# Patient Record
Sex: FEMALE
[De-identification: names, ages, dates, MRNs, and addresses within clinical notes are randomized per-mention and may not be internally consistent; named-entity substitution may affect disease eponyms.]

---

## 2017-02-15 ENCOUNTER — HOSPITAL ENCOUNTER (OUTPATIENT)
Dept: HOSPITAL 53 - M LAB REF | Age: 82
End: 2017-02-15
Attending: INTERNAL MEDICINE
Payer: MEDICARE

## 2017-02-15 DIAGNOSIS — D50.9: Primary | ICD-10-CM

## 2017-02-15 LAB
FERRITIN SERPL-MCNC: 15 NG/ML (ref 8–252)
IRON SATN MFR SERPL: 7.5 % (ref 13.2–37.4)
TIBC SERPL-MCNC: 427 UG/DL (ref 250–450)

## 2017-02-16 ENCOUNTER — HOSPITAL ENCOUNTER (OUTPATIENT)
Dept: HOSPITAL 53 - M LAB REF | Age: 82
End: 2017-02-16
Attending: INTERNAL MEDICINE
Payer: MEDICARE

## 2017-02-16 DIAGNOSIS — D64.9: Primary | ICD-10-CM

## 2017-02-16 LAB
IRON SATN MFR SERPL: 28.2 % (ref 13.2–37.4)
TIBC SERPL-MCNC: 451 UG/DL (ref 250–450)

## 2017-03-02 ENCOUNTER — HOSPITAL ENCOUNTER (OUTPATIENT)
Dept: HOSPITAL 53 - M INFU | Age: 82
Discharge: HOME | End: 2017-03-02
Attending: INTERNAL MEDICINE
Payer: MEDICARE

## 2017-03-02 VITALS — WEIGHT: 138.23 LBS | HEIGHT: 59 IN | BODY MASS INDEX: 27.87 KG/M2

## 2017-03-02 DIAGNOSIS — D50.9: Primary | ICD-10-CM

## 2017-03-02 DIAGNOSIS — Z79.4: ICD-10-CM

## 2017-03-02 DIAGNOSIS — Z79.899: ICD-10-CM

## 2017-03-02 DIAGNOSIS — Z88.2: ICD-10-CM

## 2017-03-02 DIAGNOSIS — Z79.82: ICD-10-CM

## 2017-03-02 DIAGNOSIS — Z88.1: ICD-10-CM

## 2017-03-02 PROCEDURE — 96366 THER/PROPH/DIAG IV INF ADDON: CPT

## 2017-03-02 PROCEDURE — 96365 THER/PROPH/DIAG IV INF INIT: CPT

## 2017-04-20 ENCOUNTER — HOSPITAL ENCOUNTER (OUTPATIENT)
Dept: HOSPITAL 53 - M RAD | Age: 82
End: 2017-04-20
Attending: SURGERY
Payer: MEDICARE

## 2017-04-20 DIAGNOSIS — I65.22: Primary | ICD-10-CM

## 2017-04-20 NOTE — REP
MRA CAROTIDS WITHOUT CONTRAST:

 

HISTORY:  Carotid occlusion.

 

Unenhanced 2D time-of-flight MR angiography was performed at the level of the

carotid bifurcations.

 

The distal right common carotid artery and origins of the right external and

internal carotid arteries are normal.  There are no atherosclerotic lesions.

There is severe stenosis of 90% of the distal left common carotid artery. There

is severe stenosis and 90% of the left internal carotid artery at its origin.

There is moderate stenosis of 60% of the left external carotid artery at its

origin.  The vertebral arteries are patent.  The right vertebral artery is

dominant.

 

IMPRESSION:

 

1.  There is severe stenosis of 90% of the distal left common carotid artery.

 

2.  There is severe stenosis of 90% of the left internal carotid artery at its

origin.

 

 

Signed by

Andrade Medeiros MD 04/20/2017 02:01 P

## 2017-07-11 ENCOUNTER — HOSPITAL ENCOUNTER (EMERGENCY)
Dept: HOSPITAL 53 - M ED | Age: 82
Discharge: HOME | End: 2017-07-11
Payer: MEDICARE

## 2017-07-11 VITALS — WEIGHT: 132.5 LBS | HEIGHT: 58 IN | BODY MASS INDEX: 27.81 KG/M2

## 2017-07-11 VITALS — DIASTOLIC BLOOD PRESSURE: 63 MMHG | SYSTOLIC BLOOD PRESSURE: 134 MMHG

## 2017-07-11 DIAGNOSIS — R10.9: Primary | ICD-10-CM

## 2017-07-11 DIAGNOSIS — E11.9: ICD-10-CM

## 2017-07-11 DIAGNOSIS — Z86.73: ICD-10-CM

## 2017-07-11 DIAGNOSIS — N18.9: ICD-10-CM

## 2017-07-11 LAB
ALBUMIN SERPL BCG-MCNC: 3.8 GM/DL (ref 3.2–5.2)
ALBUMIN/GLOB SERPL: 1.09 {RATIO} (ref 1–1.93)
ALP SERPL-CCNC: 84 U/L (ref 45–117)
ALT SERPL W P-5'-P-CCNC: 17 U/L (ref 12–78)
ANION GAP SERPL CALC-SCNC: 9 MEQ/L (ref 8–16)
AST SERPL-CCNC: 14 U/L (ref 15–37)
BASOPHILS # BLD AUTO: 0 K/MM3 (ref 0–0.2)
BASOPHILS NFR BLD AUTO: 0.3 % (ref 0–1)
BILIRUB CONJ SERPL-MCNC: < 0.1 MG/DL (ref 0–0.2)
BILIRUB SERPL-MCNC: 0.5 MG/DL (ref 0.2–1)
BUN SERPL-MCNC: 36 MG/DL (ref 7–18)
CALCIUM SERPL-MCNC: 9.1 MG/DL (ref 8.8–10.2)
CHLORIDE SERPL-SCNC: 104 MEQ/L (ref 98–107)
CO2 SERPL-SCNC: 25 MEQ/L (ref 21–32)
CREAT SERPL-MCNC: 1.91 MG/DL (ref 0.55–1.02)
EOSINOPHIL # BLD AUTO: 0.2 K/MM3 (ref 0–0.5)
EOSINOPHIL NFR BLD AUTO: 2.4 % (ref 0–3)
ERYTHROCYTE [DISTWIDTH] IN BLOOD BY AUTOMATED COUNT: 12.6 % (ref 11.5–14.5)
GFR SERPL CREATININE-BSD FRML MDRD: 26.8 ML/MIN/{1.73_M2} (ref 32–?)
GLUCOSE SERPL-MCNC: 170 MG/DL (ref 83–110)
LARGE UNSTAINED CELL #: 0.1 K/MM3 (ref 0–0.4)
LARGE UNSTAINED CELL %: 1.1 % (ref 0–4)
LYMPHOCYTES # BLD AUTO: 0.8 K/MM3 (ref 1.5–4.5)
LYMPHOCYTES NFR BLD AUTO: 7.4 % (ref 24–44)
MCH RBC QN AUTO: 30.9 PG (ref 27–33)
MCHC RBC AUTO-ENTMCNC: 32.5 G/DL (ref 32–36.5)
MCV RBC AUTO: 95 FL (ref 80–96)
MONOCYTES # BLD AUTO: 0.6 K/MM3 (ref 0–0.8)
MONOCYTES NFR BLD AUTO: 6.3 % (ref 0–5)
NEUTROPHILS # BLD AUTO: 7.3 K/MM3 (ref 1.8–7.7)
NEUTROPHILS NFR BLD AUTO: 82.6 % (ref 36–66)
PLATELET # BLD AUTO: 198 K/MM3 (ref 150–450)
POTASSIUM SERPL-SCNC: 4.3 MEQ/L (ref 3.5–5.1)
PROT SERPL-MCNC: 7.3 GM/DL (ref 6.4–8.2)
SODIUM SERPL-SCNC: 138 MEQ/L (ref 136–145)
WBC # BLD AUTO: 8.9 K/MM3 (ref 4–10)

## 2017-07-11 PROCEDURE — 80076 HEPATIC FUNCTION PANEL: CPT

## 2017-07-11 PROCEDURE — 96376 TX/PRO/DX INJ SAME DRUG ADON: CPT

## 2017-07-11 PROCEDURE — 96375 TX/PRO/DX INJ NEW DRUG ADDON: CPT

## 2017-07-11 PROCEDURE — 85025 COMPLETE CBC W/AUTO DIFF WBC: CPT

## 2017-07-11 PROCEDURE — 94760 N-INVAS EAR/PLS OXIMETRY 1: CPT

## 2017-07-11 PROCEDURE — 83690 ASSAY OF LIPASE: CPT

## 2017-07-11 PROCEDURE — 93041 RHYTHM ECG TRACING: CPT

## 2017-07-11 PROCEDURE — 74176 CT ABD & PELVIS W/O CONTRAST: CPT

## 2017-07-11 PROCEDURE — 96374 THER/PROPH/DIAG INJ IV PUSH: CPT

## 2017-07-11 PROCEDURE — 81001 URINALYSIS AUTO W/SCOPE: CPT

## 2017-07-11 PROCEDURE — 87086 URINE CULTURE/COLONY COUNT: CPT

## 2017-07-11 PROCEDURE — 99284 EMERGENCY DEPT VISIT MOD MDM: CPT

## 2017-07-11 PROCEDURE — 80048 BASIC METABOLIC PNL TOTAL CA: CPT

## 2017-07-11 NOTE — REP
CT ABDOMEN AND PELVIS WITHOUT IV CONTRAST:

 

CT abdomen and pelvis performed without oral or IV contrast.  Sagittal and

coronal reconstruction images are performed.

 

There are chronic fibrotic changes of the lung bases.  Calcified granuloma is

seen in the right middle lobe.  The liver, gallbladder, adrenals and pancreas are

grossly unremarkable. The spleen demonstrates a nodule probably representing a

hemangioma measuring 2 cm in diameter.  A couple of tiny calcifications are seen

at the margins of  the nodule.  There is no hydronephrosis of either kidney.

Proximal ureters are not dilated.  No renal stones are seen.  There are bilateral

hip prosthesis causing adjacent streak artifact obscuring inferior pelvic

structures including the bladder and distal ends of the ureters.  There are

atherosclerotic calcifications of the abdominal aorta without aneurysm.  No

adenopathy is seen.  No free air or free fluid is seen.  There is sigmoid and

left colonic diverticulosis.  No thickening is seen of the visualized bowel

loops.  There is a large hiatal hernia.

 

IMPRESSION:

 

Large hiatal hernia.

 

Probably splenic hemangioma.  No renal stones and no hydroureteronephrosis.

However, the distal ends of the ureters and the bladder are obscured by adjacent

metallic artifact from metallic hip prostheses.  Colonic diverticulosis.  No

other acute abnormality identified.

 

 

Signed by

Wei Roque MD 07/11/2017 04:26 P

## 2017-07-12 NOTE — ED PDOC
Post-Departure Follow-Up


dr richter faxed formal report of ct abd/p for fu paulag











Lundborg-Gray,Maja MD Jul 12, 2017 10:57

## 2017-08-11 ENCOUNTER — HOSPITAL ENCOUNTER (EMERGENCY)
Dept: HOSPITAL 53 - M ED | Age: 82
LOS: 1 days | Discharge: HOME | End: 2017-08-12
Payer: MEDICARE

## 2017-08-11 VITALS — BODY MASS INDEX: 27.77 KG/M2 | WEIGHT: 132.28 LBS | HEIGHT: 58 IN

## 2017-08-11 DIAGNOSIS — E11.9: ICD-10-CM

## 2017-08-11 DIAGNOSIS — R42: Primary | ICD-10-CM

## 2017-08-11 DIAGNOSIS — D64.9: ICD-10-CM

## 2017-08-11 DIAGNOSIS — N18.9: ICD-10-CM

## 2017-08-11 LAB
ANION GAP SERPL CALC-SCNC: 6 MEQ/L (ref 8–16)
BASOPHILS # BLD AUTO: 0 K/MM3 (ref 0–0.2)
BASOPHILS NFR BLD AUTO: 0.5 % (ref 0–1)
BUN SERPL-MCNC: 24 MG/DL (ref 7–18)
CALCIUM SERPL-MCNC: 9.5 MG/DL (ref 8.8–10.2)
CHLORIDE SERPL-SCNC: 108 MEQ/L (ref 98–107)
CO2 SERPL-SCNC: 26 MEQ/L (ref 21–32)
CREAT SERPL-MCNC: 1.48 MG/DL (ref 0.55–1.02)
EOSINOPHIL # BLD AUTO: 0.1 K/MM3 (ref 0–0.5)
EOSINOPHIL NFR BLD AUTO: 1 % (ref 0–3)
ERYTHROCYTE [DISTWIDTH] IN BLOOD BY AUTOMATED COUNT: 12.4 % (ref 11.5–14.5)
GFR SERPL CREATININE-BSD FRML MDRD: 35.9 ML/MIN/{1.73_M2} (ref 32–?)
GLUCOSE SERPL-MCNC: 98 MG/DL (ref 83–110)
LARGE UNSTAINED CELL #: 0.1 K/MM3 (ref 0–0.4)
LARGE UNSTAINED CELL %: 1.3 % (ref 0–4)
LYMPHOCYTES # BLD AUTO: 0.6 K/MM3 (ref 1.5–4.5)
LYMPHOCYTES NFR BLD AUTO: 10.1 % (ref 24–44)
MCH RBC QN AUTO: 30.8 PG (ref 27–33)
MCHC RBC AUTO-ENTMCNC: 33.6 G/DL (ref 32–36.5)
MCV RBC AUTO: 91.6 FL (ref 80–96)
MONOCYTES # BLD AUTO: 0.4 K/MM3 (ref 0–0.8)
MONOCYTES NFR BLD AUTO: 6.9 % (ref 0–5)
NEUTROPHILS # BLD AUTO: 4.7 K/MM3 (ref 1.8–7.7)
NEUTROPHILS NFR BLD AUTO: 80.1 % (ref 36–66)
PLATELET # BLD AUTO: 211 K/MM3 (ref 150–450)
POTASSIUM SERPL-SCNC: 4.3 MEQ/L (ref 3.5–5.1)
SODIUM SERPL-SCNC: 140 MEQ/L (ref 136–145)
T4 FREE SERPL-MCNC: 0.97 NG/DL (ref 0.76–1.46)
WBC # BLD AUTO: 5.8 K/MM3 (ref 4–10)

## 2017-08-11 PROCEDURE — 84484 ASSAY OF TROPONIN QUANT: CPT

## 2017-08-11 PROCEDURE — 84439 ASSAY OF FREE THYROXINE: CPT

## 2017-08-11 PROCEDURE — 82553 CREATINE MB FRACTION: CPT

## 2017-08-11 PROCEDURE — 93005 ELECTROCARDIOGRAM TRACING: CPT

## 2017-08-11 PROCEDURE — 36415 COLL VENOUS BLD VENIPUNCTURE: CPT

## 2017-08-11 PROCEDURE — 93041 RHYTHM ECG TRACING: CPT

## 2017-08-11 PROCEDURE — 82550 ASSAY OF CK (CPK): CPT

## 2017-08-11 PROCEDURE — 70450 CT HEAD/BRAIN W/O DYE: CPT

## 2017-08-11 PROCEDURE — 94760 N-INVAS EAR/PLS OXIMETRY 1: CPT

## 2017-08-11 PROCEDURE — 96374 THER/PROPH/DIAG INJ IV PUSH: CPT

## 2017-08-11 PROCEDURE — 71010: CPT

## 2017-08-11 PROCEDURE — 99285 EMERGENCY DEPT VISIT HI MDM: CPT

## 2017-08-11 PROCEDURE — 80048 BASIC METABOLIC PNL TOTAL CA: CPT

## 2017-08-11 PROCEDURE — 96375 TX/PRO/DX INJ NEW DRUG ADDON: CPT

## 2017-08-11 PROCEDURE — 84443 ASSAY THYROID STIM HORMONE: CPT

## 2017-08-11 PROCEDURE — 85025 COMPLETE CBC W/AUTO DIFF WBC: CPT

## 2017-08-11 NOTE — REPUSA
CLINICAL HISTORY: Vertigo.

COMMENTS: 

A single frontal view of the chest was obtained without prior studies for comparison.

The cardiac silhouette is within normal limits. There is no evidence of hilar enlargement.

There is no evidence of a pulmonary nodule, infiltrate or atelectasis. There is no pleural effusion.

No bony abnormalities are seen.

IMPRESSION: 

Unremarkable chest.

Thank you for your kind referral of this patient.

     Electronically signed by JS CLAY MD on 08/11/2017 10:33:06 PM ET

## 2017-08-11 NOTE — REPUSA
CLINICAL HISTORY: Vertigo.

TECHNIQUE: Multiple axial CT images were obtained through the brain without IV contrast material.

COMMENTS: 

There is normal configuration of sella turcica. There are no intra or extra-axial collections. There 
is no mass effect or midline shift. There is no evidence of hematoma formation. No hydrocephalus is p
resent. The ventricles are symmetrical. No abnormal calcifications are present. 

There is diffuse age-appropriate cerebellar and cerebral atrophy with proportionally dilated ventricl
es and cortical sulci. 

There are bilateral periventricular and subcortical white matter hypolucencies compatible with mild c
hronic microvascular disease.

Otherwise, no significant focal abnormalities are seen either in the posterior fossa or supratentoria
l compartment.

IMPRESSION:

1. Age-appropriate cerebellar and cerebral atrophy.

2. Mild chronic microvascular disease.

3. No evidence of acute intracranial pathology. 

Thank you for your kind referral of this patient.

 

     Electronically signed by JS CLAY MD on 08/11/2017 10:32:23 PM ET

## 2017-08-12 VITALS — SYSTOLIC BLOOD PRESSURE: 178 MMHG | DIASTOLIC BLOOD PRESSURE: 71 MMHG

## 2017-08-12 NOTE — ECGEPIP
Stationary ECG Study

                           [facility] - ED

                                       

                                       Test Date:    2017

Pat Name:     ELIZABETH BULLOCK        Department:   

Patient ID:   [id number]                 Room:         -

Gender:       F                        Technician:   ric

:          1935               Requested By: SARA HARRIS

Order Number: [id number]     Reading MD:   Dheeraj Pizano

                                 Measurements

Intervals                              Axis          

Rate:         56                       P:            33

MI:           151                      QRS:          -14

QRSD:         83                       T:            8

QT:           412                                    

QTc:          399                                    

                           Interpretive Statements

SINUS BRADYCARDIA

NSTTW ABNORMALITIES

NO PRIORS

Electronically Signed On 2017 7:16:46 EDT by Dheeraj Pizano

## 2017-09-18 ENCOUNTER — HOSPITAL ENCOUNTER (OUTPATIENT)
Dept: HOSPITAL 53 - M LABDRAW1 | Age: 82
End: 2017-09-18
Attending: ORTHOPAEDIC SURGERY
Payer: MEDICARE

## 2017-09-18 DIAGNOSIS — M54.5: Primary | ICD-10-CM

## 2017-09-18 LAB
BASOPHILS # BLD AUTO: 0 K/MM3 (ref 0–0.2)
BASOPHILS NFR BLD AUTO: 0.7 % (ref 0–1)
EOSINOPHIL # BLD AUTO: 0.2 K/MM3 (ref 0–0.5)
EOSINOPHIL NFR BLD AUTO: 4.1 % (ref 0–3)
ERYTHROCYTE [DISTWIDTH] IN BLOOD BY AUTOMATED COUNT: 12.6 % (ref 11.5–14.5)
ERYTHROCYTE [SEDIMENTATION RATE] IN BLOOD BY WESTERGREN METHOD: 30 MM/HR (ref 0–30)
LARGE UNSTAINED CELL #: 0.1 K/MM3 (ref 0–0.4)
LARGE UNSTAINED CELL %: 1.7 % (ref 0–4)
LYMPHOCYTES # BLD AUTO: 0.9 K/MM3 (ref 1.5–4.5)
LYMPHOCYTES NFR BLD AUTO: 13.9 % (ref 24–44)
MCH RBC QN AUTO: 30 PG (ref 27–33)
MCHC RBC AUTO-ENTMCNC: 32.5 G/DL (ref 32–36.5)
MCV RBC AUTO: 92.4 FL (ref 80–96)
MONOCYTES # BLD AUTO: 0.5 K/MM3 (ref 0–0.8)
MONOCYTES NFR BLD AUTO: 8 % (ref 0–5)
NEUTROPHILS # BLD AUTO: 4.3 K/MM3 (ref 1.8–7.7)
NEUTROPHILS NFR BLD AUTO: 71.7 % (ref 36–66)
PLATELET # BLD AUTO: 192 K/MM3 (ref 150–450)
WBC # BLD AUTO: 6 K/MM3 (ref 4–10)

## 2018-03-27 ENCOUNTER — HOSPITAL ENCOUNTER (OUTPATIENT)
Dept: HOSPITAL 53 - M WHC | Age: 83
End: 2018-03-27
Attending: NURSE PRACTITIONER
Payer: MEDICARE

## 2018-03-27 DIAGNOSIS — Z12.12: ICD-10-CM

## 2018-03-27 DIAGNOSIS — Z12.31: Primary | ICD-10-CM

## 2018-03-27 DIAGNOSIS — Z78.0: ICD-10-CM

## 2018-03-27 DIAGNOSIS — Z80.0: ICD-10-CM

## 2018-03-27 DIAGNOSIS — Z01.419: Primary | ICD-10-CM

## 2018-03-27 DIAGNOSIS — N95.2: ICD-10-CM

## 2018-03-27 DIAGNOSIS — Z12.31: ICD-10-CM

## 2018-03-27 PROCEDURE — 77067 SCR MAMMO BI INCL CAD: CPT

## 2018-04-12 ENCOUNTER — HOSPITAL ENCOUNTER (OUTPATIENT)
Dept: HOSPITAL 53 - M INFU | Age: 83
Discharge: HOME | End: 2018-04-12
Attending: INTERNAL MEDICINE
Payer: MEDICARE

## 2018-04-12 DIAGNOSIS — D64.9: Primary | ICD-10-CM

## 2018-04-12 DIAGNOSIS — Z88.8: ICD-10-CM

## 2018-04-12 DIAGNOSIS — E32.9: ICD-10-CM

## 2018-04-12 DIAGNOSIS — I50.9: ICD-10-CM

## 2018-04-12 DIAGNOSIS — Z79.4: ICD-10-CM

## 2018-04-12 DIAGNOSIS — E03.9: ICD-10-CM

## 2018-04-12 DIAGNOSIS — N18.4: ICD-10-CM

## 2018-04-12 DIAGNOSIS — Z79.899: ICD-10-CM

## 2018-04-12 DIAGNOSIS — I11.9: ICD-10-CM

## 2018-04-12 DIAGNOSIS — E11.9: ICD-10-CM

## 2018-04-12 LAB — IMMEDIATE SPIN CROSSMATCH: 1 1

## 2018-04-12 PROCEDURE — 36430 TRANSFUSION BLD/BLD COMPNT: CPT

## 2018-04-19 ENCOUNTER — HOSPITAL ENCOUNTER (OUTPATIENT)
Dept: HOSPITAL 53 - M INFU | Age: 83
End: 2018-04-19
Attending: INTERNAL MEDICINE
Payer: MEDICARE

## 2018-04-19 DIAGNOSIS — Z88.8: ICD-10-CM

## 2018-04-19 DIAGNOSIS — E03.9: ICD-10-CM

## 2018-04-19 DIAGNOSIS — M54.2: ICD-10-CM

## 2018-04-19 DIAGNOSIS — I12.9: ICD-10-CM

## 2018-04-19 DIAGNOSIS — Z79.899: ICD-10-CM

## 2018-04-19 DIAGNOSIS — Z79.4: ICD-10-CM

## 2018-04-19 DIAGNOSIS — N18.4: ICD-10-CM

## 2018-04-19 DIAGNOSIS — D50.9: Primary | ICD-10-CM

## 2018-04-19 DIAGNOSIS — F32.9: ICD-10-CM

## 2018-04-19 DIAGNOSIS — Z79.891: ICD-10-CM

## 2018-04-19 DIAGNOSIS — I50.9: ICD-10-CM

## 2018-04-19 RX ADMIN — SODIUM CHLORIDE 1 MLS/HR: 9 INJECTION, SOLUTION INTRAVENOUS at 14:35

## 2018-04-19 RX ADMIN — SODIUM CHLORIDE 1 MLS/HR: 9 INJECTION, SOLUTION INTRAVENOUS at 13:35

## 2018-05-16 ENCOUNTER — HOSPITAL ENCOUNTER (OUTPATIENT)
Dept: HOSPITAL 53 - M LAB REF | Age: 83
End: 2018-05-16
Attending: INTERNAL MEDICINE
Payer: MEDICARE

## 2018-05-16 DIAGNOSIS — D50.9: Primary | ICD-10-CM

## 2018-05-16 LAB
FERRITIN: 71 NG/ML (ref 8–252)
IRON (FE): 54 UG/DL (ref 50–170)
IRON SATN MFR SERPL: 15.8 % (ref 13.2–45)
TOTAL IRON BINDING CAPACITY: 341 UG/DL (ref 250–450)

## 2018-05-16 PROCEDURE — 83550 IRON BINDING TEST: CPT

## 2018-12-05 ENCOUNTER — HOSPITAL ENCOUNTER (OUTPATIENT)
Dept: HOSPITAL 53 - M INFU | Age: 83
Discharge: HOME | End: 2018-12-05
Attending: INTERNAL MEDICINE
Payer: MEDICARE

## 2018-12-05 DIAGNOSIS — D50.9: Primary | ICD-10-CM

## 2018-12-05 DIAGNOSIS — Z88.8: ICD-10-CM

## 2018-12-05 DIAGNOSIS — Z88.2: ICD-10-CM

## 2018-12-05 RX ADMIN — SODIUM CHLORIDE 1 MLS/HR: 9 INJECTION, SOLUTION INTRAVENOUS at 10:38

## 2018-12-05 RX ADMIN — SODIUM CHLORIDE 1 MLS/HR: 9 INJECTION, SOLUTION INTRAVENOUS at 09:30

## 2018-12-20 ENCOUNTER — HOSPITAL ENCOUNTER (OUTPATIENT)
Dept: HOSPITAL 53 - M LAB REF | Age: 83
End: 2018-12-20
Attending: INTERNAL MEDICINE
Payer: MEDICARE

## 2018-12-20 DIAGNOSIS — D64.9: Primary | ICD-10-CM

## 2018-12-20 LAB
FERRITIN SERPL-MCNC: 160 NG/ML (ref 8–252)
IRON SATN MFR SERPL: 19.7 % (ref 13.2–45)
IRON SERPL-MCNC: 68 UG/DL (ref 50–170)
TIBC SERPL-MCNC: 345 UG/DL (ref 250–450)

## 2019-02-11 ENCOUNTER — HOSPITAL ENCOUNTER (OUTPATIENT)
Dept: HOSPITAL 53 - M LABDRAW1 | Age: 84
End: 2019-02-11
Attending: PHYSICIAN ASSISTANT
Payer: MEDICARE

## 2019-02-11 DIAGNOSIS — E03.9: Primary | ICD-10-CM

## 2019-02-11 DIAGNOSIS — D50.9: Primary | ICD-10-CM

## 2019-02-11 DIAGNOSIS — N18.4: ICD-10-CM

## 2019-02-11 DIAGNOSIS — D63.1: ICD-10-CM

## 2019-02-11 DIAGNOSIS — E11.21: ICD-10-CM

## 2019-02-11 DIAGNOSIS — D50.9: ICD-10-CM

## 2019-02-11 LAB
ALBUMIN SERPL BCG-MCNC: 4.2 GM/DL (ref 3.2–5.2)
ALT SERPL W P-5'-P-CCNC: 18 U/L (ref 12–78)
BASOPHILS # BLD AUTO: 0 10^3/UL (ref 0–0.2)
BASOPHILS NFR BLD AUTO: 0.6 % (ref 0–1)
BILIRUB SERPL-MCNC: 0.6 MG/DL (ref 0.2–1)
BUN SERPL-MCNC: 18 MG/DL (ref 7–18)
CALCIUM SERPL-MCNC: 9.3 MG/DL (ref 8.8–10.2)
CHLORIDE SERPL-SCNC: 105 MEQ/L (ref 98–107)
CO2 SERPL-SCNC: 25 MEQ/L (ref 21–32)
CREAT SERPL-MCNC: 1.46 MG/DL (ref 0.55–1.3)
EOSINOPHIL # BLD AUTO: 0.1 10^3/UL (ref 0–0.5)
EOSINOPHIL NFR BLD AUTO: 1.7 % (ref 0–3)
EST. AVERAGE GLUCOSE BLD GHB EST-MCNC: 166 MG/DL (ref 60–110)
FERRITIN SERPL-MCNC: 47 NG/ML (ref 8–252)
GFR SERPL CREATININE-BSD FRML MDRD: 36.4 ML/MIN/{1.73_M2} (ref 32–?)
GLUCOSE SERPL-MCNC: 65 MG/DL (ref 70–100)
HCT VFR BLD AUTO: 34.9 % (ref 36–47)
HCT VFR BLD AUTO: 34.9 % (ref 36–47)
HGB BLD-MCNC: 10.7 G/DL (ref 12–15.5)
HGB BLD-MCNC: 10.7 G/DL (ref 12–15.5)
IRON SERPL-MCNC: 60 UG/DL (ref 50–170)
LYMPHOCYTES # BLD AUTO: 0.8 10^3/UL (ref 1.5–4.5)
LYMPHOCYTES NFR BLD AUTO: 10.6 % (ref 24–44)
MCH RBC QN AUTO: 28.8 PG (ref 27–33)
MCH RBC QN AUTO: 28.8 PG (ref 27–33)
MCHC RBC AUTO-ENTMCNC: 30.7 G/DL (ref 32–36.5)
MCHC RBC AUTO-ENTMCNC: 30.7 G/DL (ref 32–36.5)
MCV RBC AUTO: 94.1 FL (ref 80–96)
MCV RBC AUTO: 94.1 FL (ref 80–96)
MONOCYTES # BLD AUTO: 0.7 10^3/UL (ref 0–0.8)
MONOCYTES NFR BLD AUTO: 9.8 % (ref 0–5)
NEUTROPHILS # BLD AUTO: 5.6 10^3/UL (ref 1.8–7.7)
NEUTROPHILS NFR BLD AUTO: 77 % (ref 36–66)
PLATELET # BLD AUTO: 259 10^3/UL (ref 150–450)
PLATELET # BLD AUTO: 259 10^3/UL (ref 150–450)
POTASSIUM SERPL-SCNC: 4.2 MEQ/L (ref 3.5–5.1)
PROT SERPL-MCNC: 7.2 GM/DL (ref 6.4–8.2)
RBC # BLD AUTO: 3.71 10^6/UL (ref 4–5.4)
RBC # BLD AUTO: 3.71 10^6/UL (ref 4–5.4)
SODIUM SERPL-SCNC: 139 MEQ/L (ref 136–145)
T4 FREE SERPL-MCNC: 0.91 NG/DL (ref 0.76–1.46)
TSH SERPL DL<=0.005 MIU/L-ACNC: 4.78 UIU/ML (ref 0.36–3.74)
WBC # BLD AUTO: 7.2 10^3/UL (ref 4–10)
WBC # BLD AUTO: 7.2 10^3/UL (ref 4–10)

## 2019-04-23 ENCOUNTER — HOSPITAL ENCOUNTER (OUTPATIENT)
Dept: HOSPITAL 53 - M LABDRAW1 | Age: 84
End: 2019-04-23
Attending: PHYSICIAN ASSISTANT
Payer: MEDICARE

## 2019-04-23 DIAGNOSIS — E03.9: ICD-10-CM

## 2019-04-23 DIAGNOSIS — D63.1: ICD-10-CM

## 2019-04-23 DIAGNOSIS — N18.9: Primary | ICD-10-CM

## 2019-04-23 LAB
BASOPHILS # BLD AUTO: 0.1 10^3/UL (ref 0–0.2)
BASOPHILS NFR BLD AUTO: 0.8 % (ref 0–1)
EOSINOPHIL # BLD AUTO: 0.2 10^3/UL (ref 0–0.5)
EOSINOPHIL NFR BLD AUTO: 3.3 % (ref 0–3)
FERRITIN SERPL-MCNC: 24 NG/ML (ref 8–252)
HCT VFR BLD AUTO: 32.6 % (ref 36–47)
HGB BLD-MCNC: 10.4 G/DL (ref 12–15.5)
IRON SATN MFR SERPL: 14.6 % (ref 13.2–45)
IRON SERPL-MCNC: 52 UG/DL (ref 50–170)
LYMPHOCYTES # BLD AUTO: 1 10^3/UL (ref 1.5–4.5)
LYMPHOCYTES NFR BLD AUTO: 15.6 % (ref 24–44)
MCH RBC QN AUTO: 29.3 PG (ref 27–33)
MCHC RBC AUTO-ENTMCNC: 31.9 G/DL (ref 32–36.5)
MCV RBC AUTO: 91.8 FL (ref 80–96)
MONOCYTES # BLD AUTO: 0.8 10^3/UL (ref 0–0.8)
MONOCYTES NFR BLD AUTO: 11.6 % (ref 0–5)
NEUTROPHILS # BLD AUTO: 4.6 10^3/UL (ref 1.8–7.7)
NEUTROPHILS NFR BLD AUTO: 68.2 % (ref 36–66)
PLATELET # BLD AUTO: 234 10^3/UL (ref 150–450)
RBC # BLD AUTO: 3.55 10^6/UL (ref 4–5.4)
T4 FREE SERPL-MCNC: 0.99 NG/DL (ref 0.76–1.46)
TIBC SERPL-MCNC: 355 UG/DL (ref 250–450)
TSH SERPL DL<=0.005 MIU/L-ACNC: 2.41 UIU/ML (ref 0.36–3.74)
WBC # BLD AUTO: 6.7 10^3/UL (ref 4–10)

## 2019-06-07 ENCOUNTER — HOSPITAL ENCOUNTER (EMERGENCY)
Dept: HOSPITAL 53 - M ED | Age: 84
LOS: 1 days | Discharge: ANOTHER HOSPITAL | End: 2019-06-08
Payer: MEDICARE

## 2019-06-07 VITALS — DIASTOLIC BLOOD PRESSURE: 70 MMHG | SYSTOLIC BLOOD PRESSURE: 153 MMHG

## 2019-06-07 VITALS — BODY MASS INDEX: 26.5 KG/M2 | WEIGHT: 126.26 LBS | HEIGHT: 58 IN

## 2019-06-07 DIAGNOSIS — N28.9: ICD-10-CM

## 2019-06-07 DIAGNOSIS — R11.2: ICD-10-CM

## 2019-06-07 DIAGNOSIS — Z88.1: ICD-10-CM

## 2019-06-07 DIAGNOSIS — Z79.899: ICD-10-CM

## 2019-06-07 DIAGNOSIS — K21.9: ICD-10-CM

## 2019-06-07 DIAGNOSIS — I10: ICD-10-CM

## 2019-06-07 DIAGNOSIS — D64.9: ICD-10-CM

## 2019-06-07 DIAGNOSIS — Z79.4: ICD-10-CM

## 2019-06-07 DIAGNOSIS — R79.89: Primary | ICD-10-CM

## 2019-06-07 DIAGNOSIS — E11.9: ICD-10-CM

## 2019-06-07 DIAGNOSIS — Z88.2: ICD-10-CM

## 2019-06-07 LAB
ALBUMIN SERPL BCG-MCNC: 3.9 GM/DL (ref 3.2–5.2)
ALT SERPL W P-5'-P-CCNC: 20 U/L (ref 12–78)
BASOPHILS # BLD AUTO: 0 10^3/UL (ref 0–0.2)
BASOPHILS NFR BLD AUTO: 0.1 % (ref 0–1)
BILIRUB CONJ SERPL-MCNC: 0.2 MG/DL (ref 0–0.2)
BILIRUB SERPL-MCNC: 1.1 MG/DL (ref 0.2–1)
CK MB CFR.DF SERPL CALC: 3.68
CK MB SERPL-MCNC: 7.7 NG/ML (ref ?–3.6)
CK SERPL-CCNC: 209 U/L (ref 26–192)
EOSINOPHIL # BLD AUTO: 0 10^3/UL (ref 0–0.5)
EOSINOPHIL NFR BLD AUTO: 0 % (ref 0–3)
HCT VFR BLD AUTO: 34.9 % (ref 36–47)
HGB BLD-MCNC: 11.6 G/DL (ref 12–15.5)
LIPASE SERPL-CCNC: 184 U/L (ref 73–393)
LYMPHOCYTES # BLD AUTO: 0.5 10^3/UL (ref 1.5–4.5)
LYMPHOCYTES NFR BLD AUTO: 3.1 % (ref 24–44)
MCH RBC QN AUTO: 29.6 PG (ref 27–33)
MCHC RBC AUTO-ENTMCNC: 33.2 G/DL (ref 32–36.5)
MCV RBC AUTO: 89 FL (ref 80–96)
MONOCYTES # BLD AUTO: 1.5 10^3/UL (ref 0–0.8)
MONOCYTES NFR BLD AUTO: 9.3 % (ref 0–5)
NEUTROPHILS # BLD AUTO: 14.2 10^3/UL (ref 1.8–7.7)
NEUTROPHILS NFR BLD AUTO: 87.1 % (ref 36–66)
PLATELET # BLD AUTO: 318 10^3/UL (ref 150–450)
PROT SERPL-MCNC: 7.6 GM/DL (ref 6.4–8.2)
RBC # BLD AUTO: 3.92 10^6/UL (ref 4–5.4)
TROPONIN I SERPL-MCNC: 0.57 NG/ML (ref ?–0.1)
WBC # BLD AUTO: 16.3 10^3/UL (ref 4–10)

## 2019-06-07 PROCEDURE — 81001 URINALYSIS AUTO W/SCOPE: CPT

## 2019-06-07 PROCEDURE — 80048 BASIC METABOLIC PNL TOTAL CA: CPT

## 2019-06-07 PROCEDURE — 84484 ASSAY OF TROPONIN QUANT: CPT

## 2019-06-07 PROCEDURE — 83690 ASSAY OF LIPASE: CPT

## 2019-06-07 PROCEDURE — 93005 ELECTROCARDIOGRAM TRACING: CPT

## 2019-06-07 PROCEDURE — 99285 EMERGENCY DEPT VISIT HI MDM: CPT

## 2019-06-07 PROCEDURE — 80047 BASIC METABLC PNL IONIZED CA: CPT

## 2019-06-07 PROCEDURE — 36415 COLL VENOUS BLD VENIPUNCTURE: CPT

## 2019-06-07 PROCEDURE — 96375 TX/PRO/DX INJ NEW DRUG ADDON: CPT

## 2019-06-07 PROCEDURE — 96361 HYDRATE IV INFUSION ADD-ON: CPT

## 2019-06-07 PROCEDURE — 82550 ASSAY OF CK (CPK): CPT

## 2019-06-07 PROCEDURE — 93041 RHYTHM ECG TRACING: CPT

## 2019-06-07 PROCEDURE — 96376 TX/PRO/DX INJ SAME DRUG ADON: CPT

## 2019-06-07 PROCEDURE — 85025 COMPLETE CBC W/AUTO DIFF WBC: CPT

## 2019-06-07 PROCEDURE — 96374 THER/PROPH/DIAG INJ IV PUSH: CPT

## 2019-06-07 PROCEDURE — 82553 CREATINE MB FRACTION: CPT

## 2019-06-07 PROCEDURE — 74021 RADEX ABDOMEN 3+ VIEWS: CPT

## 2019-06-07 PROCEDURE — 80076 HEPATIC FUNCTION PANEL: CPT

## 2019-06-07 RX ADMIN — SODIUM CHLORIDE SCH MLS/HR: 9 INJECTION, SOLUTION INTRAVENOUS at 21:20

## 2019-06-08 VITALS — DIASTOLIC BLOOD PRESSURE: 65 MMHG | SYSTOLIC BLOOD PRESSURE: 152 MMHG

## 2019-06-08 LAB
BUN SERPL-MCNC: 29 MG/DL (ref 7–18)
CALCIUM SERPL-MCNC: 7.9 MG/DL (ref 8.8–10.2)
CHLORIDE SERPL-SCNC: 108 MEQ/L (ref 98–107)
CK MB CFR.DF SERPL CALC: 3.25
CK MB SERPL-MCNC: 8 NG/ML (ref ?–3.6)
CK SERPL-CCNC: 246 U/L (ref 26–192)
CO2 SERPL-SCNC: 22 MEQ/L (ref 21–32)
CREAT SERPL-MCNC: 1.57 MG/DL (ref 0.55–1.3)
GFR SERPL CREATININE-BSD FRML MDRD: 33.4 ML/MIN/{1.73_M2} (ref 32–?)
GLUCOSE SERPL-MCNC: 160 MG/DL (ref 70–100)
POTASSIUM SERPL-SCNC: 4.3 MEQ/L (ref 3.5–5.1)
SODIUM SERPL-SCNC: 141 MEQ/L (ref 136–145)
TROPONIN I SERPL-MCNC: 0.8 NG/ML (ref ?–0.1)

## 2019-06-08 RX ADMIN — SODIUM CHLORIDE SCH MLS/HR: 9 INJECTION, SOLUTION INTRAVENOUS at 02:18

## 2019-06-08 NOTE — REP
ABDOMEN, THREE VIEWS:

 

HISTORY:  Abdominal pain.

 

COMPARISON: 08/11/2017

 

A small amount of air is present in the intestine. Several air fluid levels are

present.  There are no dilated loops of intestine. There is  no pneumoperitoneum.

The lungs are clear.

 

IMPRESSION:

 

Nonspecific bowel gas pattern.

 

 

Electronically Signed by

Andrade Medeiros MD 06/08/2019 08:52 A

## 2019-06-08 NOTE — ECGEPIP
[facility] - ED

                                       

                                       Test Date:    2019

Pat Name:     ELIZABETH BULLOCK        Department:   

Patient ID:   [id number]                 Room:         -

Gender:       Female                   Technician:   lauri

:          1935               Requested By: DHARA DOUGLAS

Order Number: [id number]     Reading MD:   Maximino Rodgers

                                 Measurements

Intervals                              Axis          

Rate:         93                       P:            54

[place]:           137                      QRS:          

QRSD:         82                       T:            20

QT:           359                                    

QTc:          447                                    

                           Interpretive Statements

SINUS RHYTHM

Nonspecific ST-T wave abnormalities

Baseline artifact

Electronically Signed on 2019 9:14:50 EDT by Maximino Rodgers

## 2019-06-08 NOTE — ECGEPIP
[facility] - ED

                                       

                                       Test Date:    2019

Pat Name:     ELIZABETH BULLOCK        Department:   

Patient ID:   [id number]                 Room:         -

Gender:       Female                   Technician:   AK

:          1935               Requested By: DHARA DOUGLAS

Order Number: [id number]     Reading MD:   Maximino Rodgers

                                 Measurements

Intervals                              Axis          

Rate:         87                       P:            46

MA:           123                      QRS:          

QRSD:         75                       T:            5

QT:           370                                    

QTc:          446                                    

                           Interpretive Statements

SINUS RHYTHM

Nonspecific ST-T wave abnormalities

Electronically Signed on 2019 9:17:20 EDT by Maximino Rodgers

## 2019-06-24 ENCOUNTER — HOSPITAL ENCOUNTER (INPATIENT)
Dept: HOSPITAL 53 - M ED | Age: 84
LOS: 2 days | Discharge: NURSING, REHAB OR LONG-TERM CARE | DRG: 920 | End: 2019-06-26
Attending: GENERAL PRACTICE | Admitting: HOSPITALIST
Payer: MEDICARE

## 2019-06-24 VITALS — DIASTOLIC BLOOD PRESSURE: 60 MMHG | SYSTOLIC BLOOD PRESSURE: 156 MMHG

## 2019-06-24 VITALS — WEIGHT: 125 LBS | BODY MASS INDEX: 26.97 KG/M2 | HEIGHT: 57 IN

## 2019-06-24 VITALS — SYSTOLIC BLOOD PRESSURE: 156 MMHG | DIASTOLIC BLOOD PRESSURE: 62 MMHG

## 2019-06-24 DIAGNOSIS — T81.31XA: Primary | ICD-10-CM

## 2019-06-24 DIAGNOSIS — Z88.8: ICD-10-CM

## 2019-06-24 DIAGNOSIS — E11.9: ICD-10-CM
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
## 2019-06-24 DIAGNOSIS — Z88.2: ICD-10-CM

## 2019-06-24 DIAGNOSIS — Z79.4: ICD-10-CM

## 2019-06-24 DIAGNOSIS — K21.9: ICD-10-CM

## 2019-06-24 DIAGNOSIS — I50.9: ICD-10-CM

## 2019-06-24 DIAGNOSIS — Z79.899: ICD-10-CM

## 2019-06-24 DIAGNOSIS — E03.9: ICD-10-CM

## 2019-06-24 DIAGNOSIS — D62: ICD-10-CM

## 2019-06-24 DIAGNOSIS — Z85.038: ICD-10-CM

## 2019-06-24 DIAGNOSIS — N18.4: ICD-10-CM

## 2019-06-24 DIAGNOSIS — I13.0: ICD-10-CM

## 2019-06-24 LAB
ALBUMIN SERPL BCG-MCNC: 2.5 GM/DL (ref 3.2–5.2)
ALT SERPL W P-5'-P-CCNC: 18 U/L (ref 12–78)
BASOPHILS # BLD AUTO: 0.1 10^3/UL (ref 0–0.2)
BASOPHILS NFR BLD AUTO: 0.7 % (ref 0–1)
BILIRUB CONJ SERPL-MCNC: 0.1 MG/DL (ref 0–0.2)
BILIRUB SERPL-MCNC: 0.2 MG/DL (ref 0.2–1)
BUN SERPL-MCNC: 16 MG/DL (ref 7–18)
CALCIUM SERPL-MCNC: 8.9 MG/DL (ref 8.8–10.2)
CHLORIDE SERPL-SCNC: 101 MEQ/L (ref 98–107)
CK MB CFR.DF SERPL CALC: 4.17
CK MB SERPL-MCNC: < 1 NG/ML (ref ?–3.6)
CK SERPL-CCNC: 24 U/L (ref 26–192)
CO2 SERPL-SCNC: 29 MEQ/L (ref 21–32)
CREAT SERPL-MCNC: 1.3 MG/DL (ref 0.55–1.3)
EOSINOPHIL # BLD AUTO: 0.2 10^3/UL (ref 0–0.5)
EOSINOPHIL NFR BLD AUTO: 2.2 % (ref 0–3)
GFR SERPL CREATININE-BSD FRML MDRD: 41.5 ML/MIN/{1.73_M2} (ref 32–?)
GLUCOSE SERPL-MCNC: 149 MG/DL (ref 70–100)
HCT VFR BLD AUTO: 29.4 % (ref 36–47)
HGB BLD-MCNC: 9.2 G/DL (ref 12–15.5)
INR PPP: 1
LIPASE SERPL-CCNC: 687 U/L (ref 73–393)
LYMPHOCYTES # BLD AUTO: 0.9 10^3/UL (ref 1.5–4.5)
LYMPHOCYTES NFR BLD AUTO: 9.9 % (ref 24–44)
MCH RBC QN AUTO: 28.2 PG (ref 27–33)
MCHC RBC AUTO-ENTMCNC: 31.3 G/DL (ref 32–36.5)
MCV RBC AUTO: 90.2 FL (ref 80–96)
MONOCYTES # BLD AUTO: 1 10^3/UL (ref 0–0.8)
MONOCYTES NFR BLD AUTO: 10.9 % (ref 0–5)
NEUTROPHILS # BLD AUTO: 6.6 10^3/UL (ref 1.8–7.7)
NEUTROPHILS NFR BLD AUTO: 75.6 % (ref 36–66)
PLATELET # BLD AUTO: 507 10^3/UL (ref 150–450)
POTASSIUM SERPL-SCNC: 4.1 MEQ/L (ref 3.5–5.1)
PROT SERPL-MCNC: 6.2 GM/DL (ref 6.4–8.2)
PROTHROMBIN TIME: 12.9 SECONDS (ref 11.8–14)
RBC # BLD AUTO: 3.26 10^6/UL (ref 4–5.4)
SODIUM SERPL-SCNC: 138 MEQ/L (ref 136–145)
TROPONIN I SERPL-MCNC: < 0.02 NG/ML (ref ?–0.1)
WBC # BLD AUTO: 8.7 10^3/UL (ref 4–10)

## 2019-06-24 RX ADMIN — INSULIN DETEMIR SCH UNITS: 100 INJECTION, SOLUTION SUBCUTANEOUS at 21:00

## 2019-06-24 RX ADMIN — SODIUM CHLORIDE SCH MLS/HR: 9 INJECTION, SOLUTION INTRAVENOUS at 16:30

## 2019-06-24 RX ADMIN — INSULIN LISPRO SCH UNITS: 100 INJECTION, SOLUTION INTRAVENOUS; SUBCUTANEOUS at 21:00

## 2019-06-24 RX ADMIN — INSULIN LISPRO SCH UNITS: 100 INJECTION, SOLUTION INTRAVENOUS; SUBCUTANEOUS at 17:18

## 2019-06-24 RX ADMIN — SODIUM CHLORIDE SCH MLS/HR: 9 INJECTION, SOLUTION INTRAVENOUS at 12:34

## 2019-06-24 NOTE — REP
Clinical:  Abdominal pain.  Evaluate for abscess.

 

Technique:  Real time gray scale and color evaluation using linear high frequency

transducer.

 

Findings:

Directed ultrasound examination at the site of previous surgical incision

demonstrates mild subcutaneous edema with minimal amount of suspected insinuating

fluid but no discrete drainable collection/abscess.

 

Impression:

Subcutaneous edema and minimal amount of subcutaneous stranding fluid without

definable drainable collection/abscess.

 

 

Electronically Signed by

Coy Ureña MD 06/24/2019 01:09 P

## 2019-06-24 NOTE — REP
Clinical:  Acute cerebrovascular accident.

 

Comparison:  08/11/2017 .

 

Findings:

Age-related atrophy and microvascular ischemic changes are appreciated.  The

ventricles and sulci are symmetric.  Gray-white differentiation is maintained.

There is no evidence for acute intracranial hemorrhage, mass/mass effect,

pathology or infarction.  No extra-axial fluid collection.  Calvarium is intact.

Paranasal sinuses and mastoid air cells are clear.

 

Impression:

Age related atrophy and microvascular ischemic changes.

No acute intracranial hemorrhage, infarction, or mass/mass effect.

 

 

Electronically Signed by

Coy Ureña MD 06/24/2019 01:40 P

## 2019-06-24 NOTE — REP
Clinical:  Abdominal pain

 

Technique:  Supine and cross-table lateral views of the abdomen and pelvis.

 

Findings:

Bowel gas pattern is nonspecific.  Postsurgical changes are appreciated with

suture material in the right mid abdomen and surgical staples midline.  Evidence

for prior posterior lumbar fusion and bilateral hip replacement.  Skeletal

structures demonstrate osteopenia and degenerative changes.

 

Impression:

Nonspecific bowel gas pattern.

 

 

Electronically Signed by

Coy Ureña MD 06/24/2019 12:56 P

## 2019-06-24 NOTE — HPEPDOC
General


Date of Admission


19


Date of Service:  2019


Primary Care Physician:  Nicki Meadows


Attending Physician:  DELPHINE VARGAS DO


Chief Complaint


The patient is a 84-year-old female admitted with a reason for visit of 

weakness.


Source:  Patient, Family


Exam Limitations:  No limitations





History of Present Illness


83 yo female who had colon resection 2 weeks ago in [place] for obstructing 

colon cancer brought into ED by family because of increasing weakness.  Daughter

states patient has been staying in bed except to get up for bathroom and to eat 

meals because "too weak".  Patient states poor appetite.  She states no  nausea,

no vomiting, some difficulty swallowing large pills and  yesterday abdomen pain 

(relief with tylenol).  States today abdomen pain is nagging, dull, achy and 

worse with moving in bed.  Staples still in place and daughter noticed some 

drainage. Yesterday daytime had normal stool but Last night patient had watery 

diarrhea.  Last antibiotic use post operatively 2-3 weeks ago. She has had no 

reoccurance of diarrhea today.   Daughter states on 19, she had 

brief episode of "garbled" speech lasting few minutes and then was okay - 

daughter states similar to her " chronic recurrent TIA" syndrome.    In the ED, 

CT Head showed no CVA,  Abdomen xray and US showed no wound abscess.





Home Medications


Scheduled


Ferrous Gluconate (Ferrous Gluconate) 324 Mg Tablet, 324 MG PO DAILY, (Reported)


Furosemide (Lasix) 40 Mg Tab, 40 MG PO DAILY, (Reported)


Insulin Aspart (Novolog Flexpen) 100 Unit/Ml Inj, 1 DOSE SC AC, (Reported)


   PER SLIDING SCALE 


Insulin Glargine,Hum.rec.anlog (Basaglar Kwikpen U-100) 100 Unit/1 Ml Ins

uln.pen, 30 UNIT SC QHS, (Reported)


Levothyroxine Sodium (Levothyroxine Sodium) 150 Mcg Tablet, 150 MCG PO QAM, 

(Reported)


Nifedipine (Nifedipine ER) 30 Mg Tablet.er, 30 MG PO DAILY, (Reported)





Scheduled PRN


Acetaminophen (Acetaminophen) 325 Mg Tablet, 650 MG PO Q4H PRN for PAIN, 

(Reported)





Allergies


Coded Allergies:  


     Sulfa (Sulfonamide Antibiotics) (Verified  Allergy, Intermediate, HIVES, 

19)


     lisinopril (Verified  Adverse Reaction, Unknown, DIZZINESS, 19)





Past Medical History


Medical History


Diabetes on chronic insulin without hyperglycemia but with hypoglycemia


History of CHF - unknown type


CKD stage IV (followed by Dr Acuña nephrology in [place])


GERD


Hypothyroid


HTN


Colon Cancer stage II without metastasis


Surgical History


Colon resection 2019





Family History


Significant Family History:  No pertinent family hx


mother  from pancreatitis





Social History


* Smoker:  non-smoker


Alcohol:  Denies


lives with daughter - was completely independent prior to abdomen surgery





A-FIB/CHADSVASC


A-FIB History


Current/History of A-Fib/PAF?:  No





Review of Systems


Other systems


10 systems reviewed  and negative except as per HPI





Physical Examination


General Exam:  Positive: Alert, Cooperative, No Acute Distress


Eye Exam:  Positive: PERRLA, Conjunctiva & lids normal, EOMI


ENT Exam:  Positive: Atraumatic, Mucous membr. moist/pink, Pharynx Normal, 

Tongue Midline


Neck Exam:  Positive: Supple, +2 carotid pulse wo bruit


Chest Exam:  Positive: Clear to auscultation, Normal air movement; 


   Negative: Rales, Rhonchi, Wheezing


Heart Exam:  Positive: Rate Normal, Regular Rhythm (no murmur)


Abdomen Exam:  Positive: Normal bowel sounds, Soft (NT ND no palpable masses, 

mild tenderness reproducible over incision/staples), Other (staples to mid 

abdomen intact with 1.5 cm wound dehisence.  )


Extremity Exam:  Positive: Normal pulses; 


   Negative: Clubbing, Cyanosis, Edema


Skin Exam:  Positive: Nl turgor and temperature, Other skin issue (abdomen wound

dehisense without cellulitis)


Neuro Exam:  Positive: Normal Speech, Other (decreased central muscle/core 

muscle strength, assistance needed rolling over in bed/sitting up; proximal 

thigh and forearm muscles weak bilaterally)


Psych Exam:  Positive: Mental status NL, Mood NL, Oriented x 3


Other physical findings


staples and abdomen skin cleansed with hydrogen peroxide.  18 staples removed 

with good wound approximated EXCEPT for 1.5 cm wound dehinsence from below 

umbilicus  (staples not holding skin together).  Steri strips placed above and 

below wound dehiscence.  no abdomen erythema





ABDOMEN US:Impression:


Subcutaneous edema and minimal amount of subcutaneous stranding fluid without


definable drainable collection/abscess.





Vital Signs





Vital Signs








  Date Time  Temp Pulse Resp B/P (MAP) Pulse Ox O2 Delivery O2 Flow Rate FiO2


 


19 13:53  72   97   


 


6/24/19 13:41    166/74 (104)    


 


19 11:38 98.1  16   Room Air  











Laboratory Data


Labs 24H


Laboratory Tests 2


19 12:25: 


Immature Granulocyte % (Auto) 0.7, White Blood Count 8.7, Red Blood Count 3.26L,

Hemoglobin 9.2L, Hematocrit 29.4L, Mean Corpuscular Volume 90.2, Mean C

orpuscular Hemoglobin 28.2, Mean Corpuscular Hemoglobin Concent 31.3L, Red Cell 

Distribution Width 14.6H, Platelet Count 507H, Neutrophils (%) (Auto) 75.6H, 

Lymphocytes (%) (Auto) 9.9L, Monocytes (%) (Auto) 10.9H, Eosinophils (%) (Auto) 

2.2, Basophils (%) (Auto) 0.7, Neutrophils # (Auto) 6.6, Lymphocytes # (Auto) 

0.9L, Monocytes # (Auto) 1.0H, Eosinophils # (Auto) 0.2, Basophils # (Auto) 0.1,

Nucleated Red Blood Cells % (auto) 0.0, Prothrombin Time 12.9, Prothromb Time 

International Ratio 1.00, Anion Gap 8, Glomerular Filtration Rate 41.5, Calcium 

Level 8.9, Aspartate Amino Transf (AST/SGOT) 20, Alanine Aminotransferase 

(ALT/SGPT) 18, Alkaline Phosphatase 68, Total Bilirubin 0.2, Direct Bilirubin 

0.1, Total Creatine Kinase 24L, Creatine Kinase MB < 1.0, Creatine Kinase MB 

Relative Index 4.17H, Troponin I < 0.02, Total Protein 6.2L, Albumin 2.5L, 

Albumin/Globulin Ratio 0.68L, Lipase 687H


CBC/BMP


Laboratory Tests


19 12:25








Red Blood Count 3.26 L, Mean Corpuscular Volume 90.2, Mean Corpuscular 

Hemoglobin 28.2, Mean Corpuscular Hemoglobin Concent 31.3 L, Red Cell 

Distribution Width 14.6 H, Neutrophils (%) (Auto) 75.6 H, Lymphocytes (%) (Auto)

9.9 L, Monocytes (%) (Auto) 10.9 H, Eosinophils (%) (Auto) 2.2, Basophils (%) 

(Auto) 0.7, Neutrophils # (Auto) 6.6, Lymphocytes # (Auto) 0.9 L, Monocytes # 

(Auto) 1.0 H, Eosinophils # (Auto) 0.2, Basophils # (Auto) 0.1


Microbiology


wound culture obtained from abdomen





 Assessment/Plan


Debility with proximal muscle weakness - post op (present on admission)


   PT/OT ARU consult. consider ST consult





wound dehiscence - abdomen - cleansed with H2O2 , staples removed, culture 

obtained. no signs of cellulitis.  NOT started on antibiotics





Anemia - post op from GI blood loss - no  need for transfusion at this time.  

monitor





HTN - stable - daughter has been holding nifedipine because BP not elevated.    

Will  not restart med and monitor





History of CHF - currently euvolemic. no signs of CHF.  Hold lasix.  saline lock

IVF





History of CKD IV - followed by nephrology in [place].  monitor. hold 

nephrotoxic agents.  lasix on hold.  saline lock IVF  No current signs of MANAN





Diabetes - on chronic insulin therapy with hypoglcyemia - daughter has decreased

levemir at home from 30 to 15 units with improvement of hypoglcyemia and 

stabilization of blood sugar.  continue to monitor.  Check HgA1c in AM





Elevated lipase due to recent bowel surgery





Colon Cancer stage II - will need surgical follow up in [place] in 2-4 weeks





Plan / VTE


VTE Prophylaxis Ordered?:  Yes (renal dose enoxaprin)





Plan


Anticipated Discharge:  Sub Acute Rehab


Advanced Directives:  Do Not Resuscitate (DNR), Do Not Intubate (DNI)











DELPHINE VARGAS DO               2019 14:31

## 2019-06-25 VITALS — SYSTOLIC BLOOD PRESSURE: 152 MMHG | DIASTOLIC BLOOD PRESSURE: 67 MMHG

## 2019-06-25 VITALS — SYSTOLIC BLOOD PRESSURE: 130 MMHG | DIASTOLIC BLOOD PRESSURE: 72 MMHG

## 2019-06-25 VITALS — DIASTOLIC BLOOD PRESSURE: 70 MMHG | SYSTOLIC BLOOD PRESSURE: 152 MMHG

## 2019-06-25 LAB
BUN SERPL-MCNC: 11 MG/DL (ref 7–18)
CALCIUM SERPL-MCNC: 8.6 MG/DL (ref 8.8–10.2)
CHLORIDE SERPL-SCNC: 109 MEQ/L (ref 98–107)
CO2 SERPL-SCNC: 27 MEQ/L (ref 21–32)
CREAT SERPL-MCNC: 0.94 MG/DL (ref 0.55–1.3)
EST. AVERAGE GLUCOSE BLD GHB EST-MCNC: 200 MG/DL (ref 60–110)
GFR SERPL CREATININE-BSD FRML MDRD: > 60 ML/MIN/{1.73_M2} (ref 32–?)
GLUCOSE SERPL-MCNC: 79 MG/DL (ref 70–100)
HCT VFR BLD AUTO: 25.5 % (ref 36–47)
HGB BLD-MCNC: 8.1 G/DL (ref 12–15.5)
MCH RBC QN AUTO: 28.4 PG (ref 27–33)
MCHC RBC AUTO-ENTMCNC: 31.8 G/DL (ref 32–36.5)
MCV RBC AUTO: 89.5 FL (ref 80–96)
PLATELET # BLD AUTO: 445 10^3/UL (ref 150–450)
POTASSIUM SERPL-SCNC: 4 MEQ/L (ref 3.5–5.1)
RBC # BLD AUTO: 2.85 10^6/UL (ref 4–5.4)
SODIUM SERPL-SCNC: 141 MEQ/L (ref 136–145)
WBC # BLD AUTO: 7.3 10^3/UL (ref 4–10)

## 2019-06-25 RX ADMIN — INSULIN LISPRO SCH UNITS: 100 INJECTION, SOLUTION INTRAVENOUS; SUBCUTANEOUS at 12:10

## 2019-06-25 RX ADMIN — INSULIN LISPRO SCH UNITS: 100 INJECTION, SOLUTION INTRAVENOUS; SUBCUTANEOUS at 07:26

## 2019-06-25 RX ADMIN — ENOXAPARIN SODIUM SCH MG: 30 INJECTION SUBCUTANEOUS at 08:37

## 2019-06-25 RX ADMIN — INSULIN LISPRO SCH UNITS: 100 INJECTION, SOLUTION INTRAVENOUS; SUBCUTANEOUS at 17:25

## 2019-06-25 RX ADMIN — INSULIN LISPRO SCH UNITS: 100 INJECTION, SOLUTION INTRAVENOUS; SUBCUTANEOUS at 21:00

## 2019-06-25 RX ADMIN — INSULIN DETEMIR SCH UNITS: 100 INJECTION, SOLUTION SUBCUTANEOUS at 22:29

## 2019-06-25 NOTE — ECGEPIP
[facility] - ED

                                       

                                       Test Date:    2019

Pat Name:     ELIZABETH BULLOCK        Department:   

Patient ID:   [id number]                 Room:         -

Gender:       Female                   Technician:   lauri

:          1935               Requested By: DHARA DOUGLAS

Order Number: [id number]     Reading MD:   Marla Jackson

                                 Measurements

Intervals                              Axis          

Rate:         78                       P:            48

VT:           151                      QRS:          

QRSD:         79                       T:            22

QT:           357                                    

QTc:          409                                    

                           Interpretive Statements

SINUS RHYTHM

NSTTW abnormalities

similar to prior EKG 19

Electronically Signed on 2019 15:25:27 EDT by Marla Jackson

## 2019-06-25 NOTE — IPNPDOC
Date Seen


The patient was seen on 6/25/19.





Progress Note


SUBJECTIVE:


no fever or chills overnight. per pt abdominal incision continues to have serous

drainage, soaking the abdominal pad. "No pain right now," pt says while supine 

in bed on her back with a pillow on her abdomen. no nausea, vomiting. still c/o 

generalized weakness and difficulty getting up and ambulating. 





OBJECTIVE:


Physical Examination


VITALS: Pls see below


General Exam:  Positive: Alert, Cooperative, No Acute Distress


Eye Exam:  Positive: PERRLA, Conjunctiva & lids normal, EOMI


ENT Exam:  Positive: Atraumatic, Mucous membr. moist/pink, Pharynx Normal, 

Tongue Midline


Neck Exam:  Positive: Supple, +2 carotid pulse wo bruit


Chest Exam:  Positive: Clear to auscultation, Normal air movement; 


   Negative: Rales, Rhonchi, Wheezing


Heart Exam:  Positive: Rate Normal, Regular Rhythm (no murmur)


Abdomen Exam:  Positive: Normal bowel sounds, Soft (NT ND no palpable masses, 

mild tenderness reproducible over incision/staples), Other (staples to mid 

abdomen intact with 1.5 cm wound dehisence.  )


Extremity Exam:  Positive: Normal pulses; 


   Negative: Clubbing, Cyanosis, Edema


Skin Exam:  Positive: Nl turgor and temperature, Other skin issue (abdomen wound

dehisense without cellulitis)


Neuro Exam:  Positive: Normal Speech, Other (decreased central muscle/core 

muscle strength, assistance needed rolling over in bed/sitting up; proximal 

thigh and forearm muscles weak bilaterally)


Psych Exam:  Positive: Mental status NL, Mood NL, Oriented x 3


staples and abdomen skin cleansed with hydrogen peroxide.  18 staples removed 

with good wound approximated EXCEPT for 1.5 cm wound dehinsence from below 

umbilicus  (staples not holding skin together).  Steri strips placed above and 

below wound dehiscence.  no abdomen erythema





LABORATORY DATA, IMAGING STUDIES, MICROBIOLOGY: pls see below





ABDOMEN US:Impression:


Subcutaneous edema and minimal amount of subcutaneous stranding fluid without


definable drainable collection/abscess.





Assessment/Plan:


85 yo female who had colon resection 2 weeks ago in [place] for obstructing 

colon cancer brought into ED by family because of increasing weakness.  Daughter

states patient has been staying in bed except to get up for bathroom and to eat 

meals because "too weak".  Patient states poor appetite.  She states no  nausea,

no vomiting, some difficulty swallowing large pills and  yesterday abdomen pain 

(relief with tylenol).  States today abdomen pain is nagging, dull, achy and 

worse with moving in bed.  Staples still in place and daughter noticed some 

drainage. Yesterday daytime had normal stool but Last night patient had watery 

diarrhea.  Last antibiotic use post operatively 2-3 weeks ago. She has had no 

reoccurance of diarrhea today.   Daughter states on saturday 6/22/19, she had 

brief episode of "garbled" speech lasting few minutes and then was okay - 

daughter states similar to her " chronic recurrent TIA" syndrome.    In the ED, 

CT Head showed no CVA,  Abdomen xray and US showed no wound abscess.





1.5 cm abdominal wound dehiscence


-surgical consult Dr. Gosselin for management


-obtain [facility] operative notes and discharge summary


-U/S: no abscess


- cleansed with H2O2 


-staples removed, culture obtained. 


-no signs of cellulitis on no antibiotics





Post-op Debility


-PT/OT/ARU consulted


-activity per surgery





Anemia 


- post op from GI blood loss 


- no  need for transfusion at this time.  


- monitor with serial hgb 





HTN 


- stable -


 daughter has been holding nifedipine because BP not elevated.    


Will  not restart med and monitor





History of CHF


 - currently euvolemic.


 no signs of CHF.  


Hold lasix. 


 saline lock IVF





History of CKD IV - 


followed by nephrology in [place].  


monitor. 


hold nephrotoxic agents. 


 lasix on hold.  


saline lock IVF 


 No current signs of MANAN





Diabetes 


- on chronic insulin therapy with hypoglcyemia


 - daughter has decreased levemir at home from 30 to 15 units with improvement 

of hypoglcyemia and stabilization of blood sugar.  


continue to monitor.  


HgA1c





Elevated lipase due to recent bowel surgery





Colon Cancer stage II 


- will need surgical follow up in [place] in 2-4 weeks


-obtain pathology report 


-obtain records from [place]





Plan / VTE


VTE Prophylaxis Ordered?:  Yes (renal dose enoxaprin)





VS, I&O, 24H, Fishbone


Vital Signs/I&O





Vital Signs








  Date Time  Temp Pulse Resp B/P (MAP) Pulse Ox O2 Delivery O2 Flow Rate FiO2


 


6/25/19 06:00 97.5 66 18 152/70 (97) 93   


 


6/24/19 11:38      Room Air  














I&O- Last 24 Hours up to 6 AM 


 


 6/25/19





 06:00


 


Intake Total 1650 ml


 


Output Total 0 ml


 


Balance 1650 ml











Laboratory Data


24H LABS


Laboratory Tests 2


6/24/19 12:25: 


Immature Granulocyte % (Auto) 0.7, White Blood Count 8.7, Red Blood Count 3.26L,

Hemoglobin 9.2L, Hematocrit 29.4L, Mean Corpuscular Volume 90.2, Mean C

orpuscular Hemoglobin 28.2, Mean Corpuscular Hemoglobin Concent 31.3L, Red Cell 

Distribution Width 14.6H, Platelet Count 507H, Neutrophils (%) (Auto) 75.6H, 

Lymphocytes (%) (Auto) 9.9L, Monocytes (%) (Auto) 10.9H, Eosinophils (%) (Auto) 

2.2, Basophils (%) (Auto) 0.7, Neutrophils # (Auto) 6.6, Lymphocytes # (Auto) 

0.9L, Monocytes # (Auto) 1.0H, Eosinophils # (Auto) 0.2, Basophils # (Auto) 0.1,

Nucleated Red Blood Cells % (auto) 0.0, Prothrombin Time 12.9, Prothromb Time 

International Ratio 1.00, Anion Gap 8, Glomerular Filtration Rate 41.5, Calcium 

Level 8.9, Aspartate Amino Transf (AST/SGOT) 20, Alanine Aminotransferase 

(ALT/SGPT) 18, Alkaline Phosphatase 68, Total Bilirubin 0.2, Direct Bilirubin 

0.1, Total Creatine Kinase 24L, Creatine Kinase MB < 1.0, Creatine Kinase MB 

Relative Index 4.17H, Troponin I < 0.02, Total Protein 6.2L, Albumin 2.5L, 

Albumin/Globulin Ratio 0.68L, Lipase 687H


6/24/19 16:57: Bedside Glucose (Misc Panel) 155H


6/24/19 20:30: Bedside Glucose (Misc Panel) 110


6/25/19 05:40: 


Nucleated Red Blood Cells % (auto) 0.0, Anion Gap 5L, Glomerular Filtration Rate

> 60.0, Calcium Level 8.6L, Estimated Mean Plasma Glucose 200H, Hemoglobin A1c 

8.6, Blood Urea Nitrogen 11, Creatinine 0.94, Sodium Level 141, Potassium Level 

4.0, Chloride Level 109H, Carbon Dioxide Level 27


CBC/BMP


Laboratory Tests


6/24/19 12:25








Red Blood Count 3.26 L, Mean Corpuscular Volume 90.2, Mean Corpuscular H

emoglobin 28.2, Mean Corpuscular Hemoglobin Concent 31.3 L, Red Cell 

Distribution Width 14.6 H, Neutrophils (%) (Auto) 75.6 H, Lymphocytes (%) (Auto)

9.9 L, Monocytes (%) (Auto) 10.9 H, Eosinophils (%) (Auto) 2.2, Basophils (%) 

(Auto) 0.7, Neutrophils # (Auto) 6.6, Lymphocytes # (Auto) 0.9 L, Monocytes # 

(Auto) 1.0 H, Eosinophils # (Auto) 0.2, Basophils # (Auto) 0.1





6/25/19 05:40








Red Blood Count 2.85 L, Mean Corpuscular Volume 89.5, Mean Corpuscular 

Hemoglobin 28.4, Mean Corpuscular Hemoglobin Concent 31.8 L, Red Cell 

Distribution Width 14.5, Calcium Level 8.6 L


Microbiology





Microbiology


6/24/19 Gram Stain - Final, Resulted


          


6/24/19 Wound Culture, Resulted


          Pending











BRITANY LUCIO MD            Jun 25, 2019 11:04

## 2019-06-25 NOTE — CR
DATE OF CONSULTATION:  06/25/2019

 

BRIEF HISTORY OF PRESENT ILLNESS:

The patient is an 84-year-old female who presents with drainage from her midline

incision 2 weeks after having a colectomy in [place].  She has been weak and

poor appetite, and presents to the hospital for failure to thrive at home.  I am

asked to evaluate her incision.

 

Her past medical history is significant for a history of diabetes mellitus,

history of congestive heart failure (CHF), history of chronic kidney disease,

history of gastroesophageal reflux disease, history of hypothyroidism,

hypertension and colon cancer.

 

Medications include iron, Lasix, insulin, Basaglar KwikPen, Synthroid and

nifedipine.

 

PHYSICAL EXAM:

Reveals a frail 84-year-old who looks stated age.  HEENT is unremarkable.  Neck:

Supple without adenopathy.  Lungs are clear anteriorly.  Heart is regular.

Abdomen is soft, nondistended, nontender.  Her incision is open in the lower

abdomen.  It has been attempted to be brought together with Steri-Strips;

however, some of these have come off.

 

IMPRESSION AND PLAN:

The patient has a superficial wound dehiscence.  At this point, my recommendation

is to start some dressing changes on her - I have ordered these, remove the rest

of the staples, and will see how this works for her.  She essentially has not

developed very much granulation tissue on this incision, and thus, it is

concerning that she may be someone who has some significant difficulties with

healing overall.  But once again, will see how her dressing changes go over the

next several days.

## 2019-06-26 VITALS — DIASTOLIC BLOOD PRESSURE: 71 MMHG | SYSTOLIC BLOOD PRESSURE: 136 MMHG

## 2019-06-26 RX ADMIN — INSULIN LISPRO SCH UNITS: 100 INJECTION, SOLUTION INTRAVENOUS; SUBCUTANEOUS at 07:30

## 2019-06-26 RX ADMIN — ENOXAPARIN SODIUM SCH MG: 30 INJECTION SUBCUTANEOUS at 09:00

## 2019-06-26 RX ADMIN — INSULIN LISPRO SCH UNITS: 100 INJECTION, SOLUTION INTRAVENOUS; SUBCUTANEOUS at 12:13

## 2019-06-26 RX ADMIN — ENOXAPARIN SODIUM SCH MG: 30 INJECTION SUBCUTANEOUS at 09:18

## 2019-06-26 NOTE — DS.PDOC
Discharge Summary


General


Date of Admission


Jun 24, 2019 at 14:27


Date of Discharge


June 26, 2019





Discharge Summary


Inpatient Consultant:


General Surgery: Dr. Leo Gosselin





Procedures 6/24/19:


staples removal


staples and abdomen skin cleansed with hydrogen peroxide.  18 staples removed 

with good wound approximated EXCEPT for 1.5 cm wound dehinsence from below 

umbilicus  (staples not holding skin together).  Steri strips placed above and 

below wound dehiscence.  no abdomen erythema





Discharged to: [facility]





Discharge Diagnoses:


Debility with proximal muscle weakness


1.5 cm abdominal surgical site wound dehiscence


postop anemia


HTN


colon ca stage 2 with resection 6/2019 


CKD 4


Hypothyroid





Discharge Medications: 


pls see below





Discharge instructions: wet to dry dressings per surgical recommendations. Outpt

fu with colorectal surgery in [place] as previously scheduled.





History of Presenting Illness:


83 yo female who had colon resection 2 weeks ago in [place] for obstructing 

colon cancer brought into ED by family because of increasing weakness.  Daughter

states patient has been staying in bed except to get up for bathroom and to eat 

meals because "too weak".  Patient states poor appetite.  She states no  nausea,

no vomiting, some difficulty swallowing large pills and  yesterday abdomen pain 

(relief with tylenol).  States today abdomen pain is nagging, dull, achy and 

worse with moving in bed.  Staples still in place and daughter noticed some 

drainage. Yesterday daytime had normal stool but Last night patient had watery 

diarrhea.  Last antibiotic use post operatively 2-3 weeks ago. She has had no 

reoccurance of diarrhea today.   Daughter states on saturday 6/22/19, she had 

brief episode of "garbled" speech lasting few minutes and then was okay - 

daughter states similar to her " chronic recurrent TIA" syndrome.    In the ED, 

CT Head showed no CVA,  Abdomen xray and US showed no wound abscess.





Hospital Course:


Debility with proximal muscle weakness - post op (present on admission)


-PT/OT ARU consult. 


-pt was transferred to [facility]





wound dehiscence 


- abdomen - cleansed with H2O2 , staples removed


-culture: proteus resistant to tigecycline. 


-no signs of cellulitis.  


-remained afebrile with no white count


-NOT started on antibiotics


-per General surgery: wet to dry dressings would be sufficient. no antibiotics


-outpt fu with her [place] colorectal surgeon as previously scheduled





Anemia 


- post op from GI blood loss 


- no  need for transfusion at this time. 


- Hgb 8


- denies shortness of breath, chest pain, pressure, tightness, dizziness, or 

lightheadedness





HTN 


- stable 


- daughter has been holding nifedipine because BP not elevated.    





History of CHF 


- currently euvolemic. 


-no signs of CHF.  


-Held lasix due to poor oral intake.  


- saline lock IVF





History of CKD IV 


- followed by nephrology in [place].  


- monitored with serial bmp


- hold nephrotoxic agents.  


- lasix on hold due to poor oral intake and risk of dehydration.  


- appears euvolemic


- saline lock IVF  


- No current signs of MANAN





Diabetes 


- on chronic insulin therapy with hypoglcyemia 


- daughter has decreased levemir at home from 30 to 15 units with improvement of

hypoglcyemia and stabilization of blood sugar.  


- fingersticks for monitoring.  


- pt has had poor appetite





Elevated lipase 


-due to recent bowel surgery





Colon Cancer stage II 


- will need surgical follow up in [place] in 2-4 weeks





DVT prophylaxis:


-son , pt's HCP, refused Lovenox , despite risk of DVT, PE in a pt with h/o 

colon ca 


-son wanted compression stockings





disposition: discharged to [facility]





Discharge Physical Examination:


Vitals: pls see below


General Exam:  Positive: Alert, Cooperative, No Acute Distress


Eye Exam:  Positive: PERRLA, Conjunctiva & lids normal, EOMI


ENT Exam:  Positive: Atraumatic, Mucous membr. moist/pink, Pharynx Normal, Ton

milind Midline


Neck Exam:  Positive: Supple, +2 carotid pulse wo bruit


Chest Exam:  Positive: Clear to auscultation, Normal air movement; 


   Negative: Rales, Rhonchi, Wheezing


Heart Exam:  Positive: Rate Normal, Regular Rhythm (no murmur)


Abdomen Exam:  Positive: Normal bowel sounds, Soft (NT ND no palpable masses, 

mild tenderness reproducible over incision. 1.5 cm wound dehiscence without


surrounding erythema or drainage. 


Extremity Exam:  Positive: Normal pulses; 


   Negative: Clubbing, Cyanosis, Edema


Skin Exam:  Positive: Nl turgor and temperature, Other skin issue (abdomen wound

dehisense without cellulitis)


Neuro Exam:  Positive: Normal Speech, Other (decreased central muscle/core 

muscle strength, assistance needed rolling over in bed/sitting up; proximal th

igh and forearm muscles weak bilaterally)


Psych Exam:  Positive: Mental status NL, Mood NL, Oriented x 3





Laboratory data, imaging studies, microbiology: pls see below





ABDOMEN US:Impression:


Subcutaneous edema and minimal amount of subcutaneous stranding fluid without


definable drainable collection/abscess.





Time spent on hospital discharge: 32 minutes.





Vital Signs/I&Os





Vital Signs








  Date Time  Temp Pulse Resp B/P (MAP) Pulse Ox O2 Delivery O2 Flow Rate FiO2


 


6/26/19 06:00 97.6 70 16 136/71 (92) 97   


 


6/24/19 11:38      Room Air  














I&O- Last 24 Hours up to 6 AM 


 


 6/26/19





 06:00


 


Intake Total 900 ml


 


Output Total 0 ml


 


Balance 900 ml











Laboratory Data


Labs 24H


Laboratory Tests 2


6/25/19 16:43: Bedside Glucose (Misc Panel) 215H


6/25/19 20:32: Bedside Glucose (Misc Panel) 174H


6/26/19 06:40: Bedside Glucose (Misc Panel) 44L


6/26/19 07:47: Bedside Glucose (Misc Panel) 121H


6/26/19 11:44: Bedside Glucose (Misc Panel) 201H


FSBS





Laboratory Tests








Test


 6/25/19


16:43 6/25/19


20:32 6/26/19


06:40 6/26/19


07:47 Range/Units


 


 


Bedside Glucose (Misc Panel) 215 174 44 121   MG/DL


 


Test


 6/26/19


11:44 


 


 


 Range/Units


 


 


Bedside Glucose (Misc Panel) 201      MG/DL











Microbiology





Microbiology


6/24/19 Gram Stain - Final, Resulted


          


6/24/19 Wound Culture - Preliminary, Resulted


          Proteus Mirabilis





Discharge Medications


Scheduled


Ferrous Gluconate (Ferrous Gluconate) 324 Mg Tablet, 324 MG PO DAILY, (Reported)


Furosemide (Lasix) 40 Mg Tab, 40 MG PO DAILY, (Reported)


Insulin Aspart (Novolog Flexpen) 100 Unit/Ml Inj, 1 DOSE SC AC, (Reported)


   PER SLIDING SCALE 


Insulin Glargine,Hum.rec.anlog (Basaglar Kwikpen U-100) 100 Unit/1 Ml 

Insuln.pen, 30 UNIT SC QHS, (Reported)


Levothyroxine Sodium (Levothyroxine Sodium) 150 Mcg Tablet, 150 MCG PO QAM, 

(Reported)


Nifedipine (Nifedipine ER) 30 Mg Tablet.er, 30 MG PO DAILY, (Reported)





Scheduled PRN


Acetaminophen (Acetaminophen) 325 Mg Tablet, 650 MG PO Q4H PRN for PAIN, 

(Reported)





Allergies


Coded Allergies:  


     Sulfa (Sulfonamide Antibiotics) (Verified  Allergy, Intermediate, HIVES, 

6/24/19)


     lisinopril (Verified  Adverse Reaction, Unknown, DIZZINESS, 6/24/19)











BRITANY LUCIO MD            Jun 26, 2019 16:51

## 2019-06-26 NOTE — IPNPDOC
Date Seen


The patient was seen on 6/26/19.





Progress Note


SUBJECTIVE:


no fever or chills overnight.  decreased appetite. "I wasn't really much of an 

eater." no c/o pain. wet to dry dressing done per surgery recommendations and no

binder needed when pt ambulates. still c/o weakness. no other issues per RN 

aside from pt's son requesting LOVENOX TO BE DISCONTINUED. Per son at the 

bedside, "YOU ARE NOT TO GIVE MY MOTHER ANYTHING UNLESS IT GOES THROUGH ME. THE 

SURGEONS IN [place] SENT HER HOME ON NO BLOOD THINNERS. THE MORE MEDICATIONS 

YOU GIVE, THE SICKER PEOPLE GET." Son refused to give permission to obtain 

medical records from [place] regarding his mother's diagnosis and medical 

history. "I WILL TALK TO MY SISTER, BUT YOU DO NOT HAVE MY PERMISSION TO TALK TO

ANY OF HER DOCTORS. I DON'T WANT YOU MEDDLING WITH ANYTHING. SHE IS HERE FOR 

REHAB, AND NOTHING ELSE."  Pt's son was adamant about no changes in medications,

further testing, evaluations. Son insists that "CANCER IS NOT TO BE SAID IN THIS

ROOM. YOU UNDERSTAND? HER SURGEON SAID HE TOOK IT ALL OUT. SHE DOES NOT HAVE 

CANCER." RN, Saima, was present while pt's son insisted on no Lovenox despite 

increased risk of DVT/PE in pt's with known malignancy. RN, Sherine Trujillo, was 

present when son refused to give permission to obtain medical records from 

[place] regarding his mother's diagnosis and medical history. "I WILL TALK TO 

MY SISTER, BUT YOU DO NOT HAVE MY PERMISSION TO TALK TO ANY OF HER DOCTORS. I 

DON'T WANT YOU MEDDLING WITH ANYTHING. SHE IS HERE FOR REHAB, AND NOTHING ELSE."

 Pt's son was adamant about no changes in medications, further testing, 

evaluations. Son insists that "CANCER IS NOT TO BE SAID IN THIS ROOM. YOU 

UNDERSTAND? HER SURGEON SAID HE TOOK IT ALL OUT. SHE DOES NOT HAVE CANCER." 





OBJECTIVE:


Physical Examination


VITALS: Pls see below


General Exam:  Positive: Alert, Cooperative, No Acute Distress


Eye Exam:  Positive: PERRLA, Conjunctiva & lids normal, EOMI


ENT Exam:  Positive: Atraumatic, Mucous membr. moist/pink, Pharynx Normal, 

Tongue Midline


Neck Exam:  Positive: Supple, +2 carotid pulse wo bruit


Chest Exam:  Positive: Clear to auscultation, Normal air movement; 


   Negative: Rales, Rhonchi, Wheezing


Heart Exam:  Positive: Rate Normal, Regular Rhythm (no murmur)


Abdomen Exam:  Positive: Normal bowel sounds, Soft (NT ND no palpable masses, 

mild tenderness reproducible over incision/staples), Other (staples to mid 

abdomen intact with 1.5 cm wound dehisence.  )


Extremity Exam:  Positive: Normal pulses; 


   Negative: Clubbing, Cyanosis, Edema


Skin Exam:  Positive: Nl turgor and temperature, Other skin issue (abdomen wound

dehisense without cellulitis)


Neuro Exam:  Positive: Normal Speech, Other (decreased central muscle/core 

muscle strength, assistance needed rolling over in bed/sitting up; proximal 

thigh and forearm muscles weak bilaterally)


Psych Exam:  Positive: Mental status NL, Mood NL, Oriented x 3


staples and abdomen skin cleansed with hydrogen peroxide.  18 staples removed 

with good wound approximated EXCEPT for 1.5 cm wound dehinsence from below 

umbilicus  (staples not holding skin together).  Steri strips placed above and 

below wound dehiscence.  no abdomen erythema





LABORATORY DATA, IMAGING STUDIES, MICROBIOLOGY: pls see below





ABDOMEN US:Impression:


Subcutaneous edema and minimal amount of subcutaneous stranding fluid without


definable drainable collection/abscess.





Assessment/Plan:


85 yo female who had colon resection 2 weeks ago in [place] for obstructing 

colon cancer brought into ED by family because of increasing weakness.  Daughter

states patient has been staying in bed except to get up for bathroom and to eat 

meals because "too weak".  Patient states poor appetite.  She states no  nausea,

no vomiting, some difficulty swallowing large pills and  yesterday abdomen pain 

(relief with tylenol).  States today abdomen pain is nagging, dull, achy and 

worse with moving in bed.  Staples still in place and daughter noticed some 

drainage. Yesterday daytime had normal stool but Last night patient had watery 

diarrhea.  Last antibiotic use post operatively 2-3 weeks ago. She has had no 

reoccurance of diarrhea today.   Daughter states on saturday 6/22/19, she had 

brief episode of "garbled" speech lasting few minutes and then was okay - 

daughter states similar to her " chronic recurrent TIA" syndrome.    In the ED, 

CT Head showed no CVA,  Abdomen xray and US showed no wound abscess.





1.5 cm abdominal wound dehiscence


-surgical consult Dr. Gosselin for management


-obtain [facility] operative notes and discharge summary


-U/S: no abscess


- cleansed with H2O2 


-staples removed, culture obtained. 


-no signs of cellulitis on no antibiotics





Post-op Debility


-PT/OT/ARU consulted


-activity per surgery





Anemia 


- post op from GI blood loss 


- no  need for transfusion at this time.  


- monitor with serial hgb 





HTN 


- stable -


 daughter has been holding nifedipine because BP not elevated.    


Will  not restart med and monitor





History of CHF


 - currently euvolemic.


 no signs of CHF.  


Hold lasix. 


 saline lock IVF





History of CKD IV - 


followed by nephrology in [place].  


monitor. 


hold nephrotoxic agents. 


 lasix on hold.  


saline lock IVF 


 No current signs of MANAN





Diabetes 


- on chronic insulin therapy with hypoglcyemia


 - daughter has decreased levemir at home from 30 to 15 units with improvement 

of hypoglcyemia and stabilization of blood sugar.  


continue to monitor.  


HgA1c





Elevated lipase due to recent bowel surgery





Colon Cancer stage II 


- will need surgical follow up in [place] in 2-4 weeks


-Per son at the bedside, "YOU ARE NOT TO GIVE MY MOTHER ANYTHING UNLESS IT GOES 

THROUGH ME. THE SURGEONS IN [place] SENT HER HOME ON NO BLOOD THINNERS. THE 

MORE MEDICATIONS YOU GIVE, THE SICKER PEOPLE GET." Son refused to give 

permission to obtain medical records from [place] regarding his mother's 

diagnosis and medical history. "I WILL TALK TO MY SISTER, BUT YOU DO NOT HAVE MY

PERMISSION TO TALK TO ANY OF HER DOCTORS. I DON'T WANT YOU MEDDLING WITH 

ANYTHING. SHE IS HERE FOR REHAB, AND NOTHING ELSE."  Pt's son was adamant about 

no changes in medications, further testing, evaluations. Son insists that 

"CANCER IS NOT TO BE SAID IN THIS ROOM. YOU UNDERSTAND? HER SURGEON SAID HE TOOK

IT ALL OUT. SHE DOES NOT HAVE CANCER." 


-RN, Saima Briceño , was present while pt's son insisted on no Lovenox despite 

increased risk of DVT/PE in pt's with known malignancy. "YOU ARE NOT TO GIVE MY 

MOTHER ANYTHING UNLESS IT GOES THROUGH ME. THE SURGEONS IN [place] SENT HER 

HOME ON NO BLOOD THINNERS. THE MORE MEDICATIONS YOU GIVE, THE SICKER PEOPLE 

GET." 


-RN, Sherine Godfrey, was present when son refused to give permission to obtain 

medical records from [place] regarding his mother's diagnosis and medical 

history. "I WILL TALK TO MY SISTER, BUT YOU DO NOT HAVE MY PERMISSION TO TALK TO

ANY OF HER DOCTORS. I DON'T WANT YOU MEDDLING WITH ANYTHING. SHE IS HERE FOR 

REHAB, AND NOTHING ELSE."  Pt's son was adamant about no changes in medications,

further testing, evaluations. Son insists that "CANCER IS NOT TO BE SAID IN THIS

ROOM. YOU UNDERSTAND? HER SURGEON SAID HE TOOK IT ALL OUT. SHE DOES NOT HAVE 

CANCER." 





Plan / VTE


VTE Prophylaxis Ordered?:  Yes (pt's HCP, son refused enoxaprin)





VS, I&O, 24H, Fishbone


Vital Signs/I&O





Vital Signs








  Date Time  Temp Pulse Resp B/P (MAP) Pulse Ox O2 Delivery O2 Flow Rate FiO2


 


6/26/19 06:00 97.6 70 16 136/71 (92) 97   


 


6/24/19 11:38      Room Air  














I&O- Last 24 Hours up to 6 AM 


 


 6/26/19





 06:00


 


Intake Total 900 ml


 


Output Total 0 ml


 


Balance 900 ml











Laboratory Data


24H LABS


Laboratory Tests 2


6/25/19 16:43: Bedside Glucose (Misc Panel) 215H


6/25/19 20:32: Bedside Glucose (Misc Panel) 174H


6/26/19 06:40: Bedside Glucose (Misc Panel) 44L


6/26/19 07:47: Bedside Glucose (Misc Panel) 121H


6/26/19 11:44: Bedside Glucose (Misc Panel) 201H


Microbiology





Microbiology


6/24/19 Gram Stain - Final, Resulted


          


6/24/19 Wound Culture - Preliminary, Resulted


          Proteus Mirabilis











BRITANY LUCIO MD            Jun 26, 2019 13:09

## 2019-07-02 ENCOUNTER — HOSPITAL ENCOUNTER (OUTPATIENT)
Dept: HOSPITAL 53 - SSVSNF3 | Age: 84
End: 2019-07-02
Attending: INTERNAL MEDICINE

## 2019-07-02 DIAGNOSIS — D64.9: ICD-10-CM

## 2019-07-02 DIAGNOSIS — I10: ICD-10-CM

## 2019-07-02 DIAGNOSIS — C18.9: Primary | ICD-10-CM

## 2019-07-02 DIAGNOSIS — N18.4: ICD-10-CM

## 2019-07-02 LAB
BUN SERPL-MCNC: 12 MG/DL (ref 7–18)
CALCIUM SERPL-MCNC: 9.2 MG/DL (ref 8.8–10.2)
CHLORIDE SERPL-SCNC: 104 MEQ/L (ref 98–107)
CO2 SERPL-SCNC: 24 MEQ/L (ref 21–32)
CREAT SERPL-MCNC: 1.12 MG/DL (ref 0.55–1.3)
GFR SERPL CREATININE-BSD FRML MDRD: 49.3 ML/MIN/{1.73_M2} (ref 32–?)
GLUCOSE SERPL-MCNC: 114 MG/DL (ref 70–100)
HCT VFR BLD AUTO: 29.3 % (ref 36–47)
HGB BLD-MCNC: 9.1 G/DL (ref 12–15.5)
MCH RBC QN AUTO: 28 PG (ref 27–33)
MCHC RBC AUTO-ENTMCNC: 31.1 G/DL (ref 32–36.5)
MCV RBC AUTO: 90.2 FL (ref 80–96)
PLATELET # BLD AUTO: 411 10^3/UL (ref 150–450)
POTASSIUM SERPL-SCNC: 4.9 MEQ/L (ref 3.5–5.1)
RBC # BLD AUTO: 3.25 10^6/UL (ref 4–5.4)
SODIUM SERPL-SCNC: 139 MEQ/L (ref 136–145)
WBC # BLD AUTO: 7.3 10^3/UL (ref 4–10)

## 2019-07-09 ENCOUNTER — HOSPITAL ENCOUNTER (OUTPATIENT)
Dept: HOSPITAL 53 - SSVSNF3 | Age: 84
End: 2019-07-09
Attending: INTERNAL MEDICINE
Payer: MEDICARE

## 2019-07-09 DIAGNOSIS — C18.9: Primary | ICD-10-CM

## 2019-07-09 DIAGNOSIS — D64.9: ICD-10-CM

## 2019-07-09 LAB
BUN SERPL-MCNC: 16 MG/DL (ref 7–18)
CALCIUM SERPL-MCNC: 9.3 MG/DL (ref 8.8–10.2)
CHLORIDE SERPL-SCNC: 107 MEQ/L (ref 98–107)
CO2 SERPL-SCNC: 26 MEQ/L (ref 21–32)
CREAT SERPL-MCNC: 1.31 MG/DL (ref 0.55–1.3)
GFR SERPL CREATININE-BSD FRML MDRD: 41.2 ML/MIN/{1.73_M2} (ref 32–?)
GLUCOSE SERPL-MCNC: 136 MG/DL (ref 70–100)
HCT VFR BLD AUTO: 30.4 % (ref 36–47)
HGB BLD-MCNC: 9.5 G/DL (ref 12–15.5)
MCH RBC QN AUTO: 28.2 PG (ref 27–33)
MCHC RBC AUTO-ENTMCNC: 31.3 G/DL (ref 32–36.5)
MCV RBC AUTO: 90.2 FL (ref 80–96)
PLATELET # BLD AUTO: 371 10^3/UL (ref 150–450)
POTASSIUM SERPL-SCNC: 4.1 MEQ/L (ref 3.5–5.1)
RBC # BLD AUTO: 3.37 10^6/UL (ref 4–5.4)
SODIUM SERPL-SCNC: 132 MEQ/L (ref 136–145)
WBC # BLD AUTO: 9.2 10^3/UL (ref 4–10)

## 2019-08-08 ENCOUNTER — HOSPITAL ENCOUNTER (OUTPATIENT)
Dept: HOSPITAL 53 - M LABDRAW1 | Age: 84
End: 2019-08-08
Attending: PHYSICIAN ASSISTANT
Payer: MEDICARE

## 2019-08-08 DIAGNOSIS — N18.4: Primary | ICD-10-CM

## 2019-08-08 DIAGNOSIS — D63.1: ICD-10-CM

## 2019-08-08 DIAGNOSIS — E03.9: ICD-10-CM

## 2019-08-08 DIAGNOSIS — E11.21: ICD-10-CM

## 2019-08-08 LAB
ALBUMIN SERPL BCG-MCNC: 3.5 GM/DL (ref 3.2–5.2)
ALT SERPL W P-5'-P-CCNC: 13 U/L (ref 12–78)
BASOPHILS # BLD AUTO: 0.1 10^3/UL (ref 0–0.2)
BASOPHILS NFR BLD AUTO: 0.6 % (ref 0–1)
BILIRUB SERPL-MCNC: 0.5 MG/DL (ref 0.2–1)
BUN SERPL-MCNC: 16 MG/DL (ref 7–18)
CALCIUM SERPL-MCNC: 9.3 MG/DL (ref 8.8–10.2)
CHLORIDE SERPL-SCNC: 106 MEQ/L (ref 98–107)
CO2 SERPL-SCNC: 27 MEQ/L (ref 21–32)
CREAT SERPL-MCNC: 1.12 MG/DL (ref 0.55–1.3)
EOSINOPHIL # BLD AUTO: 0.2 10^3/UL (ref 0–0.5)
EOSINOPHIL NFR BLD AUTO: 1.8 % (ref 0–3)
EST. AVERAGE GLUCOSE BLD GHB EST-MCNC: 183 MG/DL (ref 60–110)
FERRITIN SERPL-MCNC: 23 NG/ML (ref 8–252)
GFR SERPL CREATININE-BSD FRML MDRD: 49.3 ML/MIN/{1.73_M2} (ref 32–?)
GLUCOSE SERPL-MCNC: 70 MG/DL (ref 70–100)
HCT VFR BLD AUTO: 33.2 % (ref 36–47)
HGB BLD-MCNC: 10.5 G/DL (ref 12–15.5)
IRON SATN MFR SERPL: 18.2 % (ref 13.2–45)
IRON SERPL-MCNC: 57 UG/DL (ref 50–170)
LYMPHOCYTES # BLD AUTO: 1 10^3/UL (ref 1.5–4.5)
LYMPHOCYTES NFR BLD AUTO: 10.2 % (ref 24–44)
MCH RBC QN AUTO: 29.1 PG (ref 27–33)
MCHC RBC AUTO-ENTMCNC: 31.6 G/DL (ref 32–36.5)
MCV RBC AUTO: 92 FL (ref 80–96)
MONOCYTES # BLD AUTO: 1.1 10^3/UL (ref 0–0.8)
MONOCYTES NFR BLD AUTO: 11.4 % (ref 0–5)
NEUTROPHILS # BLD AUTO: 7.5 10^3/UL (ref 1.8–7.7)
NEUTROPHILS NFR BLD AUTO: 75.5 % (ref 36–66)
PLATELET # BLD AUTO: 332 10^3/UL (ref 150–450)
POTASSIUM SERPL-SCNC: 4.5 MEQ/L (ref 3.5–5.1)
PROT SERPL-MCNC: 6.6 GM/DL (ref 6.4–8.2)
RBC # BLD AUTO: 3.61 10^6/UL (ref 4–5.4)
SODIUM SERPL-SCNC: 139 MEQ/L (ref 136–145)
TIBC SERPL-MCNC: 313 UG/DL (ref 250–450)
TSH SERPL DL<=0.005 MIU/L-ACNC: 3.15 UIU/ML (ref 0.36–3.74)
WBC # BLD AUTO: 9.9 10^3/UL (ref 4–10)

## 2019-12-10 ENCOUNTER — HOSPITAL ENCOUNTER (INPATIENT)
Dept: HOSPITAL 53 - M ED | Age: 84
LOS: 6 days | Discharge: HOSPICE | DRG: 65 | End: 2019-12-16
Attending: INTERNAL MEDICINE | Admitting: INTERNAL MEDICINE
Payer: MEDICARE

## 2019-12-10 VITALS — DIASTOLIC BLOOD PRESSURE: 69 MMHG | SYSTOLIC BLOOD PRESSURE: 156 MMHG

## 2019-12-10 VITALS — SYSTOLIC BLOOD PRESSURE: 93 MMHG | DIASTOLIC BLOOD PRESSURE: 61 MMHG

## 2019-12-10 VITALS — SYSTOLIC BLOOD PRESSURE: 85 MMHG | DIASTOLIC BLOOD PRESSURE: 54 MMHG

## 2019-12-10 VITALS — BODY MASS INDEX: 22.62 KG/M2 | WEIGHT: 112.22 LBS | HEIGHT: 59 IN

## 2019-12-10 VITALS — DIASTOLIC BLOOD PRESSURE: 70 MMHG | SYSTOLIC BLOOD PRESSURE: 153 MMHG

## 2019-12-10 VITALS — DIASTOLIC BLOOD PRESSURE: 66 MMHG | SYSTOLIC BLOOD PRESSURE: 145 MMHG

## 2019-12-10 VITALS — SYSTOLIC BLOOD PRESSURE: 103 MMHG | DIASTOLIC BLOOD PRESSURE: 64 MMHG

## 2019-12-10 VITALS — SYSTOLIC BLOOD PRESSURE: 125 MMHG | DIASTOLIC BLOOD PRESSURE: 64 MMHG

## 2019-12-10 VITALS — DIASTOLIC BLOOD PRESSURE: 64 MMHG | SYSTOLIC BLOOD PRESSURE: 122 MMHG

## 2019-12-10 VITALS — SYSTOLIC BLOOD PRESSURE: 108 MMHG | DIASTOLIC BLOOD PRESSURE: 75 MMHG

## 2019-12-10 VITALS — SYSTOLIC BLOOD PRESSURE: 139 MMHG | DIASTOLIC BLOOD PRESSURE: 67 MMHG

## 2019-12-10 VITALS — DIASTOLIC BLOOD PRESSURE: 57 MMHG | SYSTOLIC BLOOD PRESSURE: 112 MMHG

## 2019-12-10 DIAGNOSIS — G81.91: ICD-10-CM

## 2019-12-10 DIAGNOSIS — I65.02: ICD-10-CM

## 2019-12-10 DIAGNOSIS — I63.512: Primary | ICD-10-CM

## 2019-12-10 DIAGNOSIS — N18.4: ICD-10-CM

## 2019-12-10 DIAGNOSIS — E87.2: ICD-10-CM

## 2019-12-10 DIAGNOSIS — Z51.5: ICD-10-CM

## 2019-12-10 DIAGNOSIS — Z79.899: ICD-10-CM

## 2019-12-10 DIAGNOSIS — R47.01: ICD-10-CM

## 2019-12-10 DIAGNOSIS — J98.2: ICD-10-CM

## 2019-12-10 DIAGNOSIS — Z88.2: ICD-10-CM

## 2019-12-10 DIAGNOSIS — Z85.038: ICD-10-CM

## 2019-12-10 DIAGNOSIS — E11.65: ICD-10-CM

## 2019-12-10 DIAGNOSIS — Z90.49: ICD-10-CM

## 2019-12-10 DIAGNOSIS — E11.22: ICD-10-CM

## 2019-12-10 DIAGNOSIS — Z79.4: ICD-10-CM

## 2019-12-10 DIAGNOSIS — Z66: ICD-10-CM

## 2019-12-10 DIAGNOSIS — I50.9: ICD-10-CM

## 2019-12-10 LAB
ALBUMIN SERPL BCG-MCNC: 4.2 GM/DL (ref 3.2–5.2)
ALBUMIN SERPL BCG-MCNC: 4.2 GM/DL (ref 3.2–5.2)
ALT SERPL W P-5'-P-CCNC: 25 U/L (ref 12–78)
ALT SERPL W P-5'-P-CCNC: 25 U/L (ref 12–78)
APTT BLD: 25.7 SECONDS (ref 25–38.4)
BASOPHILS # BLD AUTO: 0 10^3/UL (ref 0–0.2)
BASOPHILS NFR BLD AUTO: 0.2 % (ref 0–1)
BILIRUB CONJ SERPL-MCNC: 0.3 MG/DL (ref 0–0.2)
BILIRUB SERPL-MCNC: 1.2 MG/DL (ref 0.2–1)
BILIRUB SERPL-MCNC: 1.2 MG/DL (ref 0.2–1)
BUN SERPL-MCNC: 42 MG/DL (ref 7–18)
CALCIUM SERPL-MCNC: 9.5 MG/DL (ref 8.8–10.2)
CHLORIDE SERPL-SCNC: 102 MEQ/L (ref 98–107)
CK MB CFR.DF SERPL CALC: 2.07
CK MB SERPL-MCNC: 12.4 NG/ML (ref ?–3.6)
CK SERPL-CCNC: 600 U/L (ref 26–192)
CO2 SERPL-SCNC: 22 MEQ/L (ref 21–32)
CREAT SERPL-MCNC: 2.76 MG/DL (ref 0.55–1.3)
EOSINOPHIL # BLD AUTO: 0 10^3/UL (ref 0–0.5)
EOSINOPHIL NFR BLD AUTO: 0 % (ref 0–3)
GFR SERPL CREATININE-BSD FRML MDRD: 17.4 ML/MIN/{1.73_M2} (ref 32–?)
GLUCOSE SERPL-MCNC: 298 MG/DL (ref 70–100)
HCT VFR BLD AUTO: 41.8 % (ref 36–47)
HGB BLD-MCNC: 12.8 G/DL (ref 12–15.5)
INR PPP: 1.04
LYMPHOCYTES # BLD AUTO: 0.4 10^3/UL (ref 1.5–5)
LYMPHOCYTES NFR BLD AUTO: 1.8 % (ref 24–44)
MAGNESIUM SERPL-MCNC: 1.8 MG/DL (ref 1.8–2.4)
MCH RBC QN AUTO: 29.5 PG (ref 27–33)
MCHC RBC AUTO-ENTMCNC: 30.6 G/DL (ref 32–36.5)
MCV RBC AUTO: 96.3 FL (ref 80–96)
MONOCYTES # BLD AUTO: 1.6 10^3/UL (ref 0–0.8)
MONOCYTES NFR BLD AUTO: 8.1 % (ref 0–5)
NEUTROPHILS # BLD AUTO: 17.4 10^3/UL (ref 1.5–8.5)
NEUTROPHILS NFR BLD AUTO: 89 % (ref 36–66)
PHOSPHATE SERPL-MCNC: 6 MG/DL (ref 2.5–4.9)
PLATELET # BLD AUTO: 268 10^3/UL (ref 150–450)
POTASSIUM SERPL-SCNC: 3.8 MEQ/L (ref 3.5–5.1)
PROT SERPL-MCNC: 7.6 GM/DL (ref 6.4–8.2)
PROT SERPL-MCNC: 7.6 GM/DL (ref 6.4–8.2)
PROTHROMBIN TIME: 13.3 SECONDS (ref 11.8–14)
RBC # BLD AUTO: 4.34 10^6/UL (ref 4–5.4)
SODIUM SERPL-SCNC: 139 MEQ/L (ref 136–145)
TROPONIN I SERPL-MCNC: 1.69 NG/ML (ref ?–0.1)
TROPONIN I SERPL-MCNC: 2.3 NG/ML (ref ?–0.1)
WBC # BLD AUTO: 19.5 10^3/UL (ref 4–10)

## 2019-12-10 RX ADMIN — SODIUM CHLORIDE SCH MLS/HR: 9 INJECTION, SOLUTION INTRAVENOUS at 11:30

## 2019-12-10 RX ADMIN — INSULIN LISPRO SCH UNITS: 100 INJECTION, SOLUTION INTRAVENOUS; SUBCUTANEOUS at 18:00

## 2019-12-10 RX ADMIN — DEXTROSE MONOHYDRATE SCH MLS/HR: 5 INJECTION INTRAVENOUS at 21:01

## 2019-12-10 NOTE — REP
CT cervical spine:  12/10/2019.

 

Indication:  Cervical spine trauma.

 

Comparison:  None.

 

Technique:  Unenhanced axial CT images of the cervical spine were performed with

coronal and sagittal reconstructions provided.

 

Findings:

 

Soft tissue emphysema is noted throughout the neck and visualized mediastinum

particularly on the left.  No apical pneumothorax is detected.

 

There is no acute fracture, subluxation or dislocation.  Spondylosis is present

most pronounced at C5/C6 and C6/C7 with diffuse disc osteophyte and considerable

disc space narrowing.  There is no evidence of hemorrhage or additional acute

post traumatic sequelae within the spinal canal.  Carotid atherosclerotic disease

is noted.

 

Impression:

 

No acute osseous injury of the cervical spine.

 

Soft tissue emphysema as described.  Dedicated chest CT is recommended.

 

 

Electronically Signed by

Steven Piña DO 12/10/2019 10:46 A

## 2019-12-10 NOTE — REP
CT ABDOMEN AND PELVIS WITHOUT IV OR ORAL CONTRAST:

 

HISTORY:  Trauma.

 

FINDINGS:  There is a moderate size sliding type hiatal hernia.  A 2 cm

peripherally calcified low-density lesion is seen in the spleen consistent with a

splenic cyst.  No other focal splenic lesion is seen.  No hepatic lesion is

observed.  There is no evidence of hemoperitoneum or pneumoperitoneum.

 

No adrenal lesion is seen.  No abnormality is noted in the gallbladder or in the

pancreas.  The kidneys are somewhat atrophic.  There is no evidence of

hydronephrosis or renal injury.  The patient is status post right partial

colectomy.  There are scattered pan colonic diverticulosis most numerous in the

sigmoid colon.  There is no CT evidence of diverticulitis.   The urinary bladder

is somewhat distended but appears intact.  There is spray artifact from bilateral

hip prosthesis sees and metallic lumbar spine fusion hardware.  Vascular

calcification is seen in a normal caliber aorta.

 

IMPRESSION:

 

Pancolonic diverticulosis status post partial right colectomy.  Urinary bladder

is distended.  Small cyst in the spleen.  Moderate size sliding hiatal hernia.

No traumatic abdominal or pelvic abnormality seen.

 

 

Electronically Signed by

Deuce Banda MD 12/10/2019 05:16 P

## 2019-12-10 NOTE — REP
CT brain:  12/10/2019.

 

Indication:  Stroke.

 

Comparison:  None.

 

Technique:  Unenhanced axial CT images of the brain were obtained from skull base

to vertex.

 

Findings:

 

Artifact is noted in the left frontal region.  No definite acute intracranial

hemorrhage is present.  There is no acute cortical infarction, mass effect or

hydrocephalous.  Diffuse volume loss is present.  Patchy areas of cerebral white

matter hypoattenuation are noted most consistent with chronic small vessel

disease.  Intracranial atherosclerotic disease is present most pronounced

involving the left V4 segment.

 

Impression:

 

No acute intracranial process.

 

Volume loss and sequelae of chronic microangiopathic ischemic disease.

 

 

Electronically Signed by

Steven Piña DO 12/10/2019 10:40 A

## 2019-12-10 NOTE — PHACANCOPD
PHARMACY VANCOMYCIN DOSING


Pt Demographics


Demographics


Patient Age:84 , Weight:52.000  , Gender: female


Adjusted Body Weight


Date: 12/10/19, Adjusted Body Weight:  Kg





Events Past 24 Hours


Events Past 24 Hours:  NO: Dialysis, Diuretic Therapy, Change in CrCl, Fever, 

Elevation in WBC, Pending Diagnostics, Pending Procedures, Other





Vancomycin


Vancomycin indication:  Sepsis


Vancomycin Target Ranges:  15-20 mcg/ml


Vancomycin Load Y/N:  Yes


Load Dose Date Time


Vancomycin Load Dose:     1000mg    Date:    12/10/19     Time:  2100


Vancomycin Dose


Date: 12/10/19. Current Vancomycin Dose: [ 1 gram Q24H ]


Intermittent Dosing?:  No





Labs


Labs





                                   Vital Signs








Label Value  Date Time


 


Patient Temperature 97.4 degrees F 12/10/19 1700


 


Temperature Source Temporal 12/10/19 1700


 


Blood Pressure Assessment 162/87 (112) 12/10/19 1200


 


Blood Pressure Assessment 135/66 (89) 12/10/19 1230


 


Blood Pressure Assessment 134/74 (94) 12/10/19 1300


 


Blood Pressure Assessment 110/63 (79) 12/10/19 1523


 


Blood Pressure Assessment 106/60 (75) 12/10/19 1645


 


Blood Pressure Assessment 112/62 (79) 12/10/19 1630


 


Blood Pressure Assessment 108/59 (75) 12/10/19 1600


 


Blood Pressure Assessment 109/62 (78) 12/10/19 1530














Item Value  Date Time


 


White Blood Count 19.5 10^3/uL H 12/10/19 0957


 


Lactic Acid Level 4.8 MMOL/L *H 12/10/19 1522


 


Creatinine 2.76 MG/DL H 12/10/19 1714


 


Glomerular Filtration Rate 17.4 L 12/10/19 1714








Micro





Microbiology


12/10/19 Blood Culture, Received


           Pending


12/10/19 Blood Culture, Received


           Pending


Creatinine Clearance


Date:12/10/19. Creatinine Clearance: [ 11.3 mL/min ].





Assessment and Plan


Maintaining Current Dose?:  Yes


Reason for dose change:  No Dose Change





Pharmacist Note


Pharmacist Note


Date: 12/10/19. Pharmacist note: Patient is a 84 year old female who presented 

to [facility] after being found unresponsive this morning.  She has a pertinent past 

medical history of chronic kidney disease. Ms. Anand was found to have an 

elevated white blood cell and lactic acid.  She was placed on empiric antibiotic

therapy for treating sepsis.  She has no previous history of vancomycin therapy 

at , so she was given a 1000mg loading dose with a 

subsequent maintenance dose of 1 gram every 24 hours.  A trough level was 

scheduled for 12/11/19 @2000, prior to her second dose, considering her weak 

renal function.  We will continue to monitor and make adjustments as necessary.











[facility]         Dec 10, 2019 21:30

## 2019-12-10 NOTE — ECGEPIP
[facility] - ED

                                       

                                       Test Date:    2019-12-10

Pat Name:     CARLOS ECHEVERRIA        Department:   

Patient ID:   [id number]                 Room:         -

Gender:       Female                   Technician:   [facility]

:          1935               Requested By: Malena Rodriguez 

Order Number: [id number]     Reading MD:   Kiran Patel

                                 Measurements

Intervals                              Axis          

Rate:         118                      P:            

ID:           [id number]                        QRS:          -17

QRSD:         71                       T:            70

QT:           325                                    

QTc:          455                                    

                           Interpretive Statements

SINUS TACHYCARDIA

NSTTW ABNORMALITIES

NO PRIORS FOR COMPARISON

Electronically Signed on 12- 18:32:37 EST by Kiran Patel

## 2019-12-10 NOTE — REP
CT CHEST WITHOUT CONTRAST:

 

HISTORY:  Trauma.

 

Comparison is made with today's portable chest x-ray showing pneumomediastinum.

 

CT FINDINGS:  Digital preliminary  radiograph demonstrates a

pneumomediastinum seen on the radiograph.  Lung fields appear clear.  On axial CT

images, there is no evidence of infiltrate or significant atelectasis.  There is

no evidence of pneumothorax or hydrothorax on either side.  There is a sliding

type hiatal hernia.  There are granulomatous lymph node calcifications in the

right hilus and in the mediastinum.  There is a calcified granuloma in the right

middle lobe.  There is no evidence of mediastinal hematoma.  Tracheobronchial

tree is unremarkable.  Some vascular calcifications noted.  There is a

low-density lesion with peripheral calcification in the spleen.  This measures

approximately 1.9 cm in diameter and is consistent with a small splenic cyst.  No

fracture or bony destructive lesion is seen.

 

IMPRESSION:

 

Moderate pneumomediastinum is noted.  Hiatal hernia is seen.  Vascular

calcification is noted.  Old granulomatous calcifications are noted.  There is a

small splenic cyst.  Otherwise no acute abnormality.

 

 

Electronically Signed by

Deuce Banda MD 12/10/2019 05:16 P

## 2019-12-10 NOTE — REP
CHEST, PORTABLE:

 

AP portable view of the chest is performed.

 

COMPARISON:  No comparison study.

 

Pneumomediastinum is present tracking up into the soft tissues of the neck.  No

pneumothorax is seen.  There is no acute infiltrate. The heart is normal in size.

There is calcification and tortuosity of the thoracic aorta.

 

IMPRESSION:

 

Pneumomediastinum.  Dr. Cardoza was informed of these findings at the time of the

exam at approximately 10:40 12/10/2019.

 

 

Electronically Signed by

Vin Souza MD 12/11/2019 10:38 A

## 2019-12-10 NOTE — REP
Intracranial MRA:  12/10/2019.

 

Indication:  Stroke.

 

Comparison:  None.

 

Findings:

 

Significantly reduced or absent flow related enhancement is noted within the left

V4 segment.  No distinct left PICA flow related enhancement is present.  There is

signal within the ICAs as well as the MCA and KAVON vessels bilaterally.  No

distinct ACoA is demonstrated.  Small left PCoA is noted. No aneurysm or AVM is

detected.

 

Impression:

 

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

 

 

Electronically Signed by

Steven Piña DO 12/10/2019 03:27 P

## 2019-12-10 NOTE — HPEPDOC
[facility] Medical History & Physical


Date of Admission


Dec 10, 2019


Date of Service:  Dec 10, 2019





History and Physical


CHIEF COMPLAINT: Unresponsiveness





HISTORY OF PRESENT ILLNESS:


Patient is 84F with PMH TIAs, IDDM, L. carotid artery stenosis s/p CEA, CKD 4, 

CHF of unknown type, Colon cancer s/p resection in June brought in by family 

after found unresponsive at home. She was last noted to be well yesterday 

morning/afternoon and was found this morning at about 9AM. Upon arrival, patient

was moving her L. extremities but has not been alert. History provided all by 

daughters at bedside. In ER, multiple problems were noted including 

hyperglycemia with BS >700, leukocytosis, lactic acidosis, elevated Troponin and

CK, as well as pneumomediastinum. Initial CT head does not show evidence of 

acute changes but follow up MRI shows evidence of acute CVA of the L. KAVON and 

MCA. 





Per daughter, patient was noted to throw up several days prior and was found to 

have blood in her mouth today, thinking that it was from her dentures. No other 

problems were noted by daughter and no further history was able to obtain. 





PAST MEDICAL HISTORY:


Refer to [facility] [place]





PAST SURGICAL HISTORY:


back surgery, b/l hip, L. sided endarderectomy





SOCIAL HISTORY:


Denies tobacco, alcohol or illicit drug use. 





FAMILY HISTORY:


Brother and father- DM





ALLERGIES: Please see below.





REVIEW OF SYSTEMS:


Unable to obtain 





HOME MEDICATIONS: Please see below. 





PHYSICAL EXAMINATION:


General: Not alert, spontaneous R. sided limb movements


Eyes: Normal sclera


HENT: Atraumatic, neck supple


Cardiovascular: Tachycardia 


Pulmonary: Clear to auscultation b/l, no wheezing


GI: Soft, nondistended, midline surgical scar with some erythema 


Skin: Warm and dry


Neuro: Unable to assess





LABORATORY DATA: See below.





IMAGING:


CT Head-


Impression:


No acute intracranial process.


Volume loss and sequelae of chronic microangiopathic ischemic disease.





CT Chest-


IMPRESSION:


Moderate pneumomediastinum is noted.  Hiatal hernia is seen.  Vascular


calcification is noted.  Old granulomatous calcifications are noted.  There is a


small splenic cyst.  Otherwise no acute abnormality.





CT Abdomen/pelvis-


IMPRESSION:


Pancolonic diverticulosis status post partial right colectomy.  Urinary bladder


is distended.  Small cyst in the spleen.  Moderate size sliding hiatal hernia.


No traumatic abdominal or pelvic abnormality seen.





Intracranial MRA-


Impression:


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





MRI Brain-


Impression:


Acute left KAVON and MCA infarctions without mass effect or hemorrhage.  Small


punctate acute left PCA infarctions as well. Extracranial carotid/vertebral CTA


is recommended.


Volume loss and sequelae of chronic microangiopathic ischemic disease.





MICROBIOLOGY: Please see below. 





ASSESSMENT AND PLAN: 


1. Acute CVA


- Acute L. KAVON and MCA infarction, appear to be early stages on MRI.


- Patient not alert but moves L. sided extremities spontaneously. Unknown onset,

last seen well one day prior to presentation. 


- Neurology consulted. Plan to repeat CT imaging in AM. 


- ASA suppository. 


2. Hyperglycemia in setting of IDDM


- Hyperglycemic on presentation but now down to 173. 


- AG 15 but NO acidosis on ABG, not likely in DKA, likely gap from lactic 

acidosis. 


- c/w SQ insulin. 


3. CKD 4


- Chronic. Monitor BMP. 


4. CHF


- Unknown type. No documented ECHO previously. 


- NO clear evidence of fluid overload at this time. 


- Monitor. 


5. Suspected sepsis with lactic acidosis


- elevated troponin, CK, and lactic acid. 


- Infectious vs. muscle breakdown. 


- Start on broad spectrum Abx. IVF support, no aggressive bolus due to history 

of CHF. 


- f/u repeat lactic acid. 


6. Elevated troponin


- In setting of impaired kidney function and muscle breakdown, cannot definitely

rule out NSTEMI however. 


- Family had decided against any aggressive intervention. 


- Monitor for now. 


7. hx Colon cancer


- s/p resection


8. Pneumomediastinum


- suspect 2/2 esophageal tear from history of vomiting and blood around the m

outh. 


- Family does not want surgical intervention. 


- c/w antibiotics and monitor. 





DVT ppx: TEDs in setting of bleeding


Code status: DNR/DNI, no aggressive surgical interventions, no central line 

placement for pressors.





Vital Signs





Vital Signs








  Date Time  Temp Pulse Resp B/P (MAP) Pulse Ox O2 Delivery O2 Flow Rate FiO2


 


12/10/19 16:45  116 26 106/60 (75) 97 Room Air  


 


12/10/19 10:30 97.7       











Laboratory Data


Labs 24H


Laboratory Tests 2


12/10/19 09:57: 


Immature Granulocyte % (Auto) 0.9, Neutrophils (%) (Auto) 89.0H, Lymphocytes (%)

(Auto) 1.8L, Monocytes (%) (Auto) 8.1H, Eosinophils (%) (Auto) 0.0, Basophils 

(%) (Auto) 0.2, Neutrophils # (Auto) 17.4H, Lymphocytes # (Auto) 0.4L, Monocytes

# (Auto) 1.6H, Eosinophils # (Auto) 0.0, Basophils # (Auto) 0.0, Nucleated Red 

Blood Cells % (auto) 0.0, Phosphorus Level 6.0H, Magnesium Level 1.8, Total 

Bilirubin 1.2H, Direct Bilirubin 0.3H, Aspartate Amino Transf (AST/SGOT) 29, 

Alanine Aminotransferase (ALT/SGPT) 25, Alkaline Phosphatase 96, Total Creatine 

Kinase 600H, Creatine Kinase MB 12.4H, Creatine Kinase MB Relative Index 2.07, 

Troponin I 1.69*H, Total Protein 7.6, Albumin 4.2, Albumin/Globulin Ratio 1.24


12/10/19 10:53: 


Bedside Prothrombin Time INR 0.9, Prothrombin Time (MISC) 11.3L


12/10/19 10:57: 


POC Glucose (Misc Panel) > 700*H, POC Sodium (Misc Panel) 132L, POC Potassium 

(Misc Panel) 4.5, POC Chloride (Misc Panel) 99, POC Total CO2 (Misc Panel) 

19.0L, POC Blood Urea Nitrogen (Misc Panel 33H, POC Ionized Calcium (Misc Panel)

4.0L, POC Creatinine (Misc Panel) 2.0H, POC Hematocrit (Misc Panel) 42.0


12/10/19 12:16: 


POC Total CO2 (Misc Panel) 18.0L, POC pH (Misc Panel) 7.390, POC Base Excess 

(Misc Panel) -8.0L, POC Saturated Percent O2 (Misc) 95, POC pO2 (Misc Panel) 

75.0L, POC pCO2 (Misc Panel) 28.8L, POC HCO3 (Misc Panel) 17.4L


12/10/19 12:52: Bedside Glucose (Misc Panel) 503*H


12/10/19 14:01: Bedside Glucose (Misc Panel) 379H


12/10/19 15:16: Bedside Glucose (Misc Panel) 300H


12/10/19 15:22: 


Prothrombin Time 13.3, Prothromb Time International Ratio 1.04, Activated 

Partial Thromboplast Time 25.7, Lactic Acid Level 4.8*H


12/10/19 16:58: Bedside Glucose (Misc Panel) 173H


12/10/19 17:14: 


CBC/BMP


Laboratory Tests


12/10/19 09:57











Microbiology





Microbiology


12/10/19 Blood Culture, Received


           Pending


12/10/19 Blood Culture, Received


           Pending





Home Medications


Scheduled


Ferrous Gluconate (Ferrous Gluconate) 324 Mg Tablet, 324 MG PO DAILY


Furosemide (Furosemide) 40 Mg Tablet, 40 MG PO DAILY


Insulin Aspart (Novolog Flexpen) 100 Unit/1 Ml Insuln.pen, 1 DOSE SC AC


   PER SLIDING SCALE 


Insulin Glargine,Hum.rec.anlog (Basaglar Kwikpen U-100) 100 Unit/1 Ml 

Insuln.pen, 15 UNIT SC QHS


Levothyroxine Sodium (Levothyroxine Sodium) 150 Mcg Tablet, 150 MCG PO DAILY


   LAST FILLED JULY 2019, FAMILY VERIFIED AS ACTIVE MED 





Allergies


Coded Allergies:  


     Sulfa (Sulfonamide Antibiotics) (Verified  Allergy, Mild, ITCH/RASH, 

12/10/19)





A-FIB/CHADSVASC


A-FIB History


Current/History of A-Fib/PAF?:  No











DEEPAK HEWITT MD                Dec 10, 2019 17:54

## 2019-12-10 NOTE — REP
MRI brain:  Of 10/19.

 

Indication:  Stroke.

 

Comparison:  None.

 

Technique:  Multiplanar short and long TR sequences of the brain were performed

without IV Gadolinium.

 

Findings:

 

Predominately left hemisphere cortical restricted diffusion is present within the

KAVON and MCA territories as well as minimally within the left PCA territory.

There is no significant mass effect.  No significant hemorrhage is detected.

There is no hydrocephalus.  Right maxillary sinus fluid and small left greater

than right mastoid effusions are present.  Chronic left thalamic lacunar

infarction is noted.  There are a few small areas of elevated white matter T2

signal within the cerebral hemispheres as well.  Volume loss is present.

 

Impression:

 

Acute left KAVON and MCA infarctions without mass effect or hemorrhage.  Small

punctate acute left PCA infarctions as well. Extracranial carotid/vertebral CTA

is recommended.

 

Volume loss and sequelae of chronic microangiopathic ischemic disease.

 

 

Electronically Signed by

Steven Piña DO 12/10/2019 03:39 P

## 2019-12-11 VITALS — SYSTOLIC BLOOD PRESSURE: 115 MMHG | DIASTOLIC BLOOD PRESSURE: 55 MMHG

## 2019-12-11 VITALS — SYSTOLIC BLOOD PRESSURE: 117 MMHG | DIASTOLIC BLOOD PRESSURE: 55 MMHG

## 2019-12-11 VITALS — SYSTOLIC BLOOD PRESSURE: 128 MMHG | DIASTOLIC BLOOD PRESSURE: 58 MMHG

## 2019-12-11 VITALS — SYSTOLIC BLOOD PRESSURE: 147 MMHG | DIASTOLIC BLOOD PRESSURE: 65 MMHG

## 2019-12-11 VITALS — DIASTOLIC BLOOD PRESSURE: 72 MMHG | SYSTOLIC BLOOD PRESSURE: 160 MMHG

## 2019-12-11 VITALS — SYSTOLIC BLOOD PRESSURE: 170 MMHG | DIASTOLIC BLOOD PRESSURE: 73 MMHG

## 2019-12-11 VITALS — SYSTOLIC BLOOD PRESSURE: 127 MMHG | DIASTOLIC BLOOD PRESSURE: 59 MMHG

## 2019-12-11 VITALS — SYSTOLIC BLOOD PRESSURE: 136 MMHG | DIASTOLIC BLOOD PRESSURE: 63 MMHG

## 2019-12-11 VITALS — DIASTOLIC BLOOD PRESSURE: 58 MMHG | SYSTOLIC BLOOD PRESSURE: 116 MMHG

## 2019-12-11 LAB
BUN SERPL-MCNC: 55 MG/DL (ref 7–18)
CALCIUM SERPL-MCNC: 8.9 MG/DL (ref 8.8–10.2)
CHLORIDE SERPL-SCNC: 109 MEQ/L (ref 98–107)
CO2 SERPL-SCNC: 23 MEQ/L (ref 21–32)
CREAT SERPL-MCNC: 2.87 MG/DL (ref 0.55–1.3)
GFR SERPL CREATININE-BSD FRML MDRD: 16.7 ML/MIN/{1.73_M2} (ref 32–?)
GLUCOSE SERPL-MCNC: 134 MG/DL (ref 70–100)
HCT VFR BLD AUTO: 34.5 % (ref 36–47)
HGB BLD-MCNC: 11.1 G/DL (ref 12–15.5)
MCH RBC QN AUTO: 28.8 PG (ref 27–33)
MCHC RBC AUTO-ENTMCNC: 32.2 G/DL (ref 32–36.5)
MCV RBC AUTO: 89.6 FL (ref 80–96)
PLATELET # BLD AUTO: 216 10^3/UL (ref 150–450)
POTASSIUM SERPL-SCNC: 3.7 MEQ/L (ref 3.5–5.1)
RBC # BLD AUTO: 3.85 10^6/UL (ref 4–5.4)
SODIUM SERPL-SCNC: 140 MEQ/L (ref 136–145)
WBC # BLD AUTO: 24.2 10^3/UL (ref 4–10)

## 2019-12-11 RX ADMIN — DEXTROSE MONOHYDRATE SCH MLS/HR: 5 INJECTION INTRAVENOUS at 11:37

## 2019-12-11 RX ADMIN — INSULIN LISPRO SCH UNITS: 100 INJECTION, SOLUTION INTRAVENOUS; SUBCUTANEOUS at 11:22

## 2019-12-11 RX ADMIN — SODIUM CHLORIDE SCH MLS/HR: 9 INJECTION, SOLUTION INTRAVENOUS at 00:54

## 2019-12-11 RX ADMIN — MORPHINE SULFATE PRN MG: 2 INJECTION, SOLUTION INTRAMUSCULAR; INTRAVENOUS at 19:47

## 2019-12-11 RX ADMIN — INSULIN LISPRO SCH UNITS: 100 INJECTION, SOLUTION INTRAVENOUS; SUBCUTANEOUS at 00:54

## 2019-12-11 RX ADMIN — SODIUM CHLORIDE SCH MLS/HR: 9 INJECTION, SOLUTION INTRAVENOUS at 08:49

## 2019-12-11 RX ADMIN — DEXTROSE MONOHYDRATE SCH MLS/HR: 5 INJECTION INTRAVENOUS at 04:40

## 2019-12-11 RX ADMIN — INSULIN LISPRO SCH UNITS: 100 INJECTION, SOLUTION INTRAVENOUS; SUBCUTANEOUS at 05:52

## 2019-12-11 RX ADMIN — SCOPALAMINE PRN MG: 1 PATCH, EXTENDED RELEASE TRANSDERMAL at 15:43

## 2019-12-11 RX ADMIN — SODIUM CHLORIDE SCH MLS/HR: 9 INJECTION, SOLUTION INTRAVENOUS at 00:35

## 2019-12-11 RX ADMIN — MORPHINE SULFATE PRN MG: 2 INJECTION, SOLUTION INTRAMUSCULAR; INTRAVENOUS at 15:42

## 2019-12-11 NOTE — REP
CT brain:  12/11/2019.

 

Indication:  Stroke.

 

Comparison:  Yesterday.

 

Technique:  Unenhanced axial CT images of the brain were obtained from skull base

to vertex.

 

Findings:

 

The recently described multifocal left cerebral hemisphere infarctions are now

apparent by CT evaluation without hemorrhagic conversion or significant mass

effect.  Volume loss and sequelae of chronic microangiopathic ischemic disease

are redemonstrated.

 

Impression:

 

Evolving left hemisphere infarctions without hemorrhagic transformation or

significant mass effect.

 

 

Electronically Signed by

Steven Piña DO 12/11/2019 09:17 A

## 2019-12-11 NOTE — CR
DATE OF CONSULTATION:  12/10/2019

 

REFERRING PHYSICIAN:  Ray Mukherjee MD

 

REASON FOR CONSULTATION:  Stroke.

 

HISTORY OF PRESENT ILLNESS:

Dulce Anand is a 84-year-old woman with history of insulin-dependent

diabetes, left carotid endarterectomy, chronic kidney disease, colon cancer who

was last known fine yesterday evening.  Her daughter found her this morning

unresponsive on the floor.  She was laying on her left side.  She was unable to

speak.  She appeared to have right-sided weakness.  Her blood sugar was more than

700 and she had blood drooling out of her mouth.  She remains unresponsive and

unable to provide any history.  CT scan of her head was unremarkable.  MRI scan

of brain showed possible early left middle cerebral artery and anterior cerebral

artery ischemic stroke.  MRA brain showed left vertebral occlusion.

 

PAST MEDICAL HISTORY:  As per HPI.

 

PAST SURGICAL HISTORY:

History of bilateral hip replacement.

Left carotid endarterectomy.

 

SOCIAL HISTORY:  There is no report of smoking, alcohol or illicit drugs.

 

FAMILY HISTORY:  Brother and father had diabetes.

 

REVIEW OF SYSTEMS:

A complete review of system was obtained and was unremarkable except as mentioned

in the history of present illness.  Review of systems was obtained from the

patient's family.

 

HOME MEDICATIONS:

- ferrous gluconate 324 mg p.o. daily

- insulin Basaglar 15 units subcutaneous at night

- levothyroxine 150 mcg p.o. daily

- Lasix 40 mg p.o. daily

 

ALLERGIES:

1.  SULFA.

 

PHYSICAL EXAMINATION:

Blood pressure 106/60, pulse 116, respiratory rate 26, 97% saturation on room

air.

Heart:  Regular rate and rhythm.

Lungs:  Clear to auscultation.

Abdomen:  Soft, nontender, nondistended.

No pedal edema.  No musculoskeletal abnormalities.  No rash.  No signs of

meningeal irritation.

The patient is unresponsive to verbal stimuli.  She withdraws to pain more so on

left side.  Her eyes are closed and she does not respond.  Speech could not be

tested, although the patient was unable to speak when her daughter found her.

She has more spontaneous movement in her left arm and leg.  Her right plantar is

upgoing.  Left plantar is downgoing.  Sensory, cerebellar and gait testing could

not be performed.

 

DIAGNOSTIC STUDIES:

The MRI scan and MRA brain are summarized above.

 

WBCs were 19.5, lactic acid 4.8, creatinine 2, blood glucose decreased from 700

to 173.

 

CT scan of chest showed pneumomediastinum.

 

ASSESSMENT:

1.  Possible left middle and anterior cerebral artery ischemic stroke.

2.  Aphasia, coma and right hemiplegia related to above.

 

PLAN:

1.  CT angiogram of neck with contrast.  It should be cleared by radiology as her

creatinine is 2 currently.

2.  CT scan of head tomorrow to see the extent of her ischemic stroke.  On MRI

scan of brain her stroke seems to be in early stages and complete size of stroke

may not be clearly visible until 24 hours or more.

3.  Aspirin 300 mg per rectum daily until she is awake or able to swallow.

 

Her overall prognosis is guarded.  I had a discussion with the patient's daughter

and son about this.

## 2019-12-11 NOTE — IPNPDOC
Date Seen


The patient was seen on 12/11/19.





Progress Note


SUBJECTIVE: 


Patient appeared to still be in some distress, eyes opens occasionally but does 

not communicate. 


Afebrile overnight. HR 90-100s overnight. 


Was called to bedside this morning as family wanted to make patient comfort 

measures only as patient appeared to be uncomfortable and family is concern that

she continues to suffer. 


Patient was made CMO. All IV medications were stopped and medications for 

comfort are started along with hospice consultation. 





OBJECTIVE


PHYSICAL EXAMINATION:


VITAL SIGNS: Please see below. 


General: Occasional opening of eyes but no verbal communication. Appear to be 

uncomfortable with heavy respiratory effort. 


Eyes: Normal sclera


HENT: Atraumatic


Cardiovascular: Normal rate. 


Pulmonary: No wheezing appreciated, labored breathing


GI: Soft, nondistended, midline surgical scar with some erythema 


Skin: Warm and dry


Neuro: Unable to assess





LABORATORY DATA, IMAGING STUDIES, MICROBIOLOGY: Please see below.





ASSESSMENT AND PLAN: 


Patient is 84F with extensive PMH TIAs, IDDM, L. carotid artery stenosis s/p 

CEA, CKD 4, CHF of unknown type, Colon cancer s/p resection in June brought in 

by family after found unresponsive at home and found to have a L. sided KAVON and 

MCA stroke in addition to pneumomediastinum suspicious for esophageal tear vs. 

carotid dissection. She was unresponsive on arrival with lactic acidosis, 

leukocytosis, elevated troponin and hyperglycemia BS >700. She was started on IV

Abx and fluids and was also evaluated by neurology. Family has been at the 

bedside almost the entire time and had clearly stated that she is DNR/DNI and 

does not want any aggressive intervention including pressor support or any 

surgical procedures. Family decided to make patient comfort care after seeing 

minimal improvement in mental status with patient appear to be in discomfort. 

Vital signs, blood draws and antibiotics were stopped. Hospice consulted.





VS, I&O, 24H, Fishbone


Vital Signs/I&O





Vital Signs








  Date Time  Temp Pulse Resp B/P (MAP) Pulse Ox O2 Delivery O2 Flow Rate FiO2


 


12/11/19 12:00 98.7 88 20 147/65 (92) 97 Nasal Cannula 3.0 














I&O- Last 24 Hours up to 6 AM 


 


 12/11/19





 06:00


 


Intake Total 2320 ml


 


Output Total 570 ml


 


Balance 1750 ml











Laboratory Data


24H LABS


Laboratory Tests 2


12/10/19 16:58: Bedside Glucose (Misc Panel) 173H


12/10/19 17:14: 


Anion Gap 15, Glomerular Filtration Rate 17.4L, Calcium Level 9.5, Total 

Bilirubin 1.2H, Aspartate Amino Transf (AST/SGOT) 35, Alanine Aminotransferase 

(ALT/SGPT) 25, Alkaline Phosphatase 88, Troponin I 2.30#*H, Total Protein 7.6, 

Albumin 4.2, Albumin/Globulin Ratio 1.24


12/10/19 17:33: Bedside Glucose (Misc Panel) 153H


12/10/19 18:36: 


Urine Color STRAW, Urine Appearance CLEAR, Urine pH 6.0, Urine Specific Gravity 

1.012, Urine Protein 3+H, Urine Glucose (UA) 3+H, Urine Ketones 1+H, Urine Blood

1+H, Urine Nitrite NEGATIVE, Urine Bilirubin NEGATIVE, Urine Urobilinogen 0.2, 

Urine Leukocyte Esterase NEGATIVE, Urine WBC (Auto) 1, Urine RBC (Auto) 1, Urine

Hyaline Casts (Auto) 0, Urine Bacteria (Auto) NEGATIVE, Urine Squamous 

Epithelial Cells 0, Urine Sperm (Auto) 


12/10/19 20:47: Bedside Glucose (Misc Panel) 283H


12/11/19 00:39: Bedside Glucose (Misc Panel) 377H


12/11/19 05:02: 


Nucleated Red Blood Cells % (auto) 0.0, Anion Gap 8, Glomerular Filtration Rate 

16.7L, Lactic Acid Level 1.4, Calcium Level 8.9


12/11/19 05:34: Bedside Glucose (Misc Panel) 126H


12/11/19 11:12: Bedside Glucose (Misc Panel) 183H


CBC/BMP


Laboratory Tests


12/10/19 17:14








12/11/19 05:02








Microbiology





Microbiology


12/10/19 Blood Culture - Preliminary, Resulted


           No growth after 24 hours . All specim...


12/10/19 Blood Culture - Preliminary, Resulted


           No growth after 24 hours . All specim...











DEEPAK HEWITT MD                Dec 11, 2019 15:56

## 2019-12-12 RX ADMIN — MORPHINE SULFATE PRN MG: 2 INJECTION, SOLUTION INTRAMUSCULAR; INTRAVENOUS at 22:59

## 2019-12-12 RX ADMIN — MORPHINE SULFATE PRN MG: 2 INJECTION, SOLUTION INTRAMUSCULAR; INTRAVENOUS at 15:49

## 2019-12-12 RX ADMIN — MORPHINE SULFATE PRN MG: 2 INJECTION, SOLUTION INTRAMUSCULAR; INTRAVENOUS at 13:54

## 2019-12-12 RX ADMIN — MORPHINE SULFATE PRN MG: 2 INJECTION, SOLUTION INTRAMUSCULAR; INTRAVENOUS at 20:33

## 2019-12-12 RX ADMIN — MORPHINE SULFATE PRN MG: 2 INJECTION, SOLUTION INTRAMUSCULAR; INTRAVENOUS at 09:30

## 2019-12-12 RX ADMIN — MORPHINE SULFATE PRN MG: 2 INJECTION, SOLUTION INTRAMUSCULAR; INTRAVENOUS at 11:49

## 2019-12-12 RX ADMIN — MORPHINE SULFATE PRN MG: 2 INJECTION, SOLUTION INTRAMUSCULAR; INTRAVENOUS at 07:13

## 2019-12-12 RX ADMIN — MORPHINE SULFATE PRN MG: 2 INJECTION, SOLUTION INTRAMUSCULAR; INTRAVENOUS at 17:49

## 2019-12-12 NOTE — REP
CT brain:  12/12/2019.

 

Indication:  Stroke.

 

Comparison:  Yesterday.

 

Technique:  Unenhanced axial CT images of the brain were obtained from skull base

to vertex.

 

Findings:

 

The multifocal left cerebral hemisphere infarctions continues to evolve without

hemorrhagic transformation or significant mass effect.  Chronic appearing left

thalamic lacunar infarction is present.  Volume loss and sequelae of chronic

microangiopathic ischemic disease are redemonstrated.

 

Impression:

 

Evolving multifocal left cerebral hemisphere infarctions without hemorrhagic

transformation or significant mass effect.

 

 

Electronically Signed by

Steven Piña DO 12/12/2019 04:18 P

## 2019-12-12 NOTE — IPNPDOC
Date Seen


The patient was seen on 12/12/19.





Progress Note


SUBJECTIVE: 


Patient appeared to be more alert today and able to communicate although not 

clearly. 


Period episodes of  frustrations noted. 


Goals of care in question as family has a difficult time deciding whether to 

keep patient CMO or continue care as she is more alert. 


No changes had been made as family plans to discuss with neurology prior. 


Repeat CT head completed. 





OBJECTIVE


PHYSICAL EXAMINATION:


VITAL SIGNS: Please see below. 


General: responsive, alert and follow some commands. Some verbal communication 

but not always clear. 


Eyes: Normal sclera


HENT: Atraumatic


Cardiovascular: Normal rate. 


Pulmonary: No wheezing appreciated. 


GI: Soft, nondistended, midline surgical scar with some erythema 


Skin: Warm and dry


Neuro:Unable to fully assess.





LABORATORY DATA, IMAGING STUDIES, MICROBIOLOGY: Please see below.





ASSESSMENT AND PLAN: 


Patient is 84F with extensive PMH TIAs, IDDM, L. carotid artery stenosis s/p 

CEA, CKD 4, CHF of unknown type, Colon cancer s/p resection in June brought in 

by family after found unresponsive at home and found to have a L. sided KAVON and 

MCA stroke in addition to pneumomediastinum suspicious for esophageal tear vs. 

carotid dissection. She was unresponsive on arrival with lactic acidosis, 

leukocytosis, elevated troponin and hyperglycemia BS >700. She was started on IV

Abx and fluids and was also evaluated by neurology. Family has been at the 

bedside almost the entire time and had clearly stated that she is DNR/DNI and 

does not want any aggressive intervention including pressor support or any 

surgical procedures. Family had decided to make patient comfort care after 

seeing minimal improvement in mental status with patient appear to be in 

discomfort. Vital signs, blood draws and antibiotics were stopped. Hospice 

consulted.





Patient now is waking up and has some form of communication. Goals of care 

decision in discussion among family members.





VS, I&O, 24H, Fishbone


Vital Signs/I&O





Vital Signs








  Date Time  Temp Pulse Resp B/P (MAP) Pulse Ox O2 Delivery O2 Flow Rate FiO2


 


12/12/19 09:00       3.0 


 


12/12/19 07:13   16     


 


12/11/19 12:00 98.7 88  147/65 (92) 97 Nasal Cannula  














I&O- Last 24 Hours up to 6 AM 


 


 12/12/19





 06:00


 


Intake Total 0 ml


 


Output Total 625 ml


 


Balance -625 ml











Laboratory Data


Microbiology





Microbiology


12/10/19 Blood Culture - Preliminary, Resulted


           No Growth after 48 hours. All Specime...


12/10/19 Blood Culture - Preliminary, Resulted


           No Growth after 48 hours. All Specime...











DEEPAK HEWITT MD                Dec 12, 2019 19:07

## 2019-12-13 RX ADMIN — MORPHINE SULFATE PRN MG: 2 INJECTION, SOLUTION INTRAMUSCULAR; INTRAVENOUS at 22:07

## 2019-12-13 RX ADMIN — MORPHINE SULFATE PRN MG: 2 INJECTION, SOLUTION INTRAMUSCULAR; INTRAVENOUS at 13:45

## 2019-12-13 RX ADMIN — MORPHINE SULFATE PRN MG: 2 INJECTION, SOLUTION INTRAMUSCULAR; INTRAVENOUS at 17:04

## 2019-12-13 NOTE — IPNPDOC
Date Seen


The patient was seen on 12/13/19.





Progress Note


SUBJECTIVE: 


Patient condition declined today compare to yesterday. 


More lethargic and confused. 


Reportedly did not sleep well. No other events reported. 





OBJECTIVE


PHYSICAL EXAMINATION:


VITAL SIGNS: Please see below. 


General: responsive, alert and follow some commands. Some verbal communication 

but not always clear. 


Eyes: Normal sclera


HENT: Atraumatic


Cardiovascular: Normal rate. 


Pulmonary: No wheezing appreciated. 


GI: Soft, nondistended, midline surgical scar with some erythema 


Skin: Warm and dry


Neuro:Unable to fully assess.





LABORATORY DATA, IMAGING STUDIES, MICROBIOLOGY: Please see below.





ASSESSMENT AND PLAN: 


Patient is 84F with extensive PMH TIAs, IDDM, L. carotid artery stenosis s/p 

CEA, CKD 4, CHF of unknown type, Colon cancer s/p resection in June brought in 

by family after found unresponsive at home and found to have a L. sided KAVON and 

MCA stroke in addition to pneumomediastinum suspicious for esophageal tear vs. 

carotid dissection. She was unresponsive on arrival with lactic acidosis, 

leukocytosis, elevated troponin and hyperglycemia BS >700. She was started on IV

Abx and fluids and was also evaluated by neurology. Family has been at the 

bedside almost the entire time and had clearly stated that she is DNR/DNI and 

does not want any aggressive intervention including pressor support or any 

surgical procedures. Family had decided to make patient comfort care after 

seeing minimal improvement in mental status with patient appear to be in 

discomfort. Vital signs, blood draws and antibiotics were stopped. Hospice 

consulted.





Patient mental status and clinical condition waxes and wanes. Family had toured 

hospice facility and placed name on list, possible transfer to hospice next week

if no changes in decision regarding CMO/Hospice are made this weekend.





[facility]


Vital Signs/I&O





Vital Signs








  Date Time  Temp Pulse Resp B/P (MAP) Pulse Ox O2 Delivery O2 Flow Rate FiO2


 


12/13/19 17:14   18     


 


12/12/19 09:00       3.0 


 


12/11/19 12:00 98.7 88  147/65 (92) 97 Nasal Cannula  














I&O- Last 24 Hours up to 6 AM 


 


 12/13/19





 06:00


 


Intake Total 720 ml


 


Output Total 1000 ml


 


Balance -280 ml











Laboratory Data


24H LABS


Laboratory Tests 2


12/13/19 17:55: Bedside Glucose (Misc Panel) 406H


Microbiology





Microbiology


12/10/19 Blood Culture - Preliminary, Resulted


           No Growth after 72 hours. All specime...


12/10/19 Blood Culture - Preliminary, Resulted


           No Growth after 72 hours. All specime...











DEEPAK HEWITT MD                Dec 13, 2019 18:59

## 2019-12-14 RX ADMIN — MORPHINE SULFATE PRN MG: 10 SOLUTION ORAL at 18:21

## 2019-12-14 RX ADMIN — MORPHINE SULFATE PRN MG: 10 SOLUTION ORAL at 09:31

## 2019-12-14 RX ADMIN — MORPHINE SULFATE PRN MG: 10 SOLUTION ORAL at 06:22

## 2019-12-14 RX ADMIN — MORPHINE SULFATE PRN MG: 10 SOLUTION ORAL at 07:52

## 2019-12-14 RX ADMIN — MORPHINE SULFATE PRN MG: 10 SOLUTION ORAL at 16:59

## 2019-12-14 NOTE — IPNPDOC
Date Seen


The patient was seen on 12/14/19.





Progress Note


SUBJECTIVE: 


Patient much more lethargic today with agonal breathing in AM.


Appear to be in more discomfort and less responsive. 


Has been sleeping most of the day. When awake, very weak with minimal verbal 

communication. 





OBJECTIVE


PHYSICAL EXAMINATION:


VITAL SIGNS: Please see below. 


General: responsive, lethargic. 


Eyes: Normal sclera


HENT: Atraumatic


Cardiovascular: Normal rate. 


Pulmonary: No wheezing appreciated. 


GI: Soft, nondistended, midline surgical scar with some erythema 


Skin: Warm and dry


Neuro:Unable to fully assess.





LABORATORY DATA, IMAGING STUDIES, MICROBIOLOGY: Please see below.





ASSESSMENT AND PLAN: 


Patient is 84F with extensive PMH TIAs, IDDM, L. carotid artery stenosis s/p 

CEA, CKD 4, CHF of unknown type, Colon cancer s/p resection in June brought in 

by family after found unresponsive at home and found to have a L. sided KAVON and 

MCA stroke in addition to pneumomediastinum suspicious for esophageal tear vs. 

carotid dissection. She was unresponsive on arrival with lactic acidosis, 

leukocytosis, elevated troponin and hyperglycemia BS >700. She was started on IV

Abx and fluids and was also evaluated by neurology. Family has been at the 

bedside almost the entire time and had clearly stated that she is DNR/DNI and do

es not want any aggressive intervention including pressor support or any 

surgical procedures. Family had decided to make patient comfort care after 

seeing minimal improvement in mental status with patient appear to be in 

discomfort. Vital signs, blood draws and antibiotics were stopped. Hospice 

consulted.





Patient mental status and clinical condition waxes and wanes. Family had toured 

hospice facility and placed name on list, possible transfer to hospice next week

if no changes in decision regarding CMO/Hospice are made this weekend.





VS, I&O, 24H, Fishbone


Vital Signs/I&O





Vital Signs








  Date Time  Temp Pulse Resp B/P (MAP) Pulse Ox O2 Delivery O2 Flow Rate FiO2


 


12/14/19 09:31   18     


 


12/12/19 09:00       3.0 


 


12/11/19 12:00 98.7 88  147/65 (92) 97 Nasal Cannula  














I&O- Last 24 Hours up to 6 AM 


 


 12/14/19





 06:00


 


Intake Total 1700 ml


 


Output Total 2450 ml


 


Balance -750 ml











Laboratory Data


Microbiology





Microbiology


12/10/19 Blood Culture - Preliminary, Resulted


           No Growth after 72 hours. All specime...


12/10/19 Blood Culture - Preliminary, Resulted


           No Growth after 72 hours. All specime...











DEEPAK HEWITT MD                Dec 14, 2019 18:27

## 2019-12-15 RX ADMIN — MORPHINE SULFATE PRN MG: 10 SOLUTION ORAL at 05:53

## 2019-12-15 RX ADMIN — MORPHINE SULFATE PRN MG: 10 SOLUTION ORAL at 03:35

## 2019-12-15 RX ADMIN — MORPHINE SULFATE PRN MG: 10 SOLUTION ORAL at 15:23

## 2019-12-15 RX ADMIN — MORPHINE SULFATE PRN MG: 10 SOLUTION ORAL at 02:55

## 2019-12-15 RX ADMIN — MORPHINE SULFATE PRN MG: 10 SOLUTION ORAL at 11:56

## 2019-12-15 RX ADMIN — MORPHINE SULFATE PRN MG: 10 SOLUTION ORAL at 19:20

## 2019-12-15 RX ADMIN — MORPHINE SULFATE PRN MG: 10 SOLUTION ORAL at 14:35

## 2019-12-15 RX ADMIN — MORPHINE SULFATE PRN MG: 10 SOLUTION ORAL at 01:58

## 2019-12-15 NOTE — IPNPDOC
Date Seen


The patient was seen on 12/15/19.





Progress Note


SUBJECTIVE: 


Patient appear to continue declining today. 


Sleeps most of the day and not coherent when she tries to communicate 

periodically. 


Weakness persistent and breathing seem more labored. 





OBJECTIVE


PHYSICAL EXAMINATION:


VITAL SIGNS: Please see below. 


General: Minimal response, lethargic. 


Eyes: Normal sclera


HENT: Atraumatic


Cardiovascular: Normal rate. 


Pulmonary: No wheezing appreciated. 


GI: Soft, nondistended, midline surgical scar with some erythema 


Skin: Warm and dry


Neuro:Unable to fully assess.





LABORATORY DATA, IMAGING STUDIES, MICROBIOLOGY: Please see below.





ASSESSMENT AND PLAN: 


Patient is 84F with extensive PMH TIAs, IDDM, L. carotid artery stenosis s/p 

CEA, CKD 4, CHF of unknown type, Colon cancer s/p resection in June brought in 

by family after found unresponsive at home and found to have a L. sided KAVON and 

MCA stroke in addition to pneumomediastinum suspicious for esophageal tear vs. 

carotid dissection. She was unresponsive on arrival with lactic acidosis, 

leukocytosis, elevated troponin and hyperglycemia BS >700. She was started on IV

Abx and fluids and was also evaluated by neurology. Family has been at the 

bedside almost the entire time and had clearly stated that she is DNR/DNI and 

does not want any aggressive intervention including pressor support or any 

surgical procedures. Family had decided to make patient comfort care after 

seeing minimal improvement in mental status with patient appear to be in 

discomfort. Vital signs, blood draws and antibiotics were stopped. Hospice 

consulted.





Patient mental status and clinical condition waxes and wanes. Family had toured 

hospice facility and placed name on list, possible transfer to hospice next week

if no changes in decision regarding CMO/Hospice are made this weekend. 





With the initial improving in mental status the day after patient was made CMO, 

there was more questioning of the right path to take. Informed family that 

patient can certainly have CMO reversed if wishes but patient's status had 

declined since then. Not taking any solid foods or drink as much anymore. Family

made clear that patient does not want feeding tubes, to be placed on dialysis or

any aggressive intervention.





VS, I&O, 24H, Fishbone


Vital Signs/I&O





Vital Signs








  Date Time  Temp Pulse Resp B/P (MAP) Pulse Ox O2 Delivery O2 Flow Rate FiO2


 


12/15/19 11:56   18   Room Air  


 


12/12/19 09:00       3.0 


 


12/11/19 12:00 98.7 88  147/65 (74) 97   














I&O- Last 24 Hours up to 6 AM 


 


 12/15/19





 06:00


 


Intake Total 650 ml


 


Output Total 1025 ml


 


Balance -375 ml











Laboratory Data


Microbiology





Microbiology


12/10/19 Blood Culture - Preliminary, Resulted


           No Growth after 72 hours. All specime...


12/10/19 Blood Culture - Final, Complete


           NO GROWTH AFTER 5 DAYS











DEEPAK HEWITT MD                Dec 15, 2019 14:30

## 2019-12-16 RX ADMIN — MORPHINE SULFATE PRN MG: 10 SOLUTION ORAL at 02:53

## 2019-12-16 RX ADMIN — SCOPALAMINE PRN MG: 1 PATCH, EXTENDED RELEASE TRANSDERMAL at 02:55

## 2019-12-16 RX ADMIN — MORPHINE SULFATE PRN MG: 10 SOLUTION ORAL at 12:11

## 2019-12-16 NOTE — DS.PDOC
Discharge Summary


General


Date of Admission


Dec 10, 2019 at 16:06


Date of Discharge


12/16/19


Attending Physician:  DESTINY BARRERA MD





Discharge Summary


PROCEDURES PERFORMED DURING STAY: [None].





ADMITTING DIAGNOSES: 


1. TIA


2. IDDM


3. Left carotid atery stenosis s/p CEA,


4. CKD stage IV,


5. CHF, Unknown type


6. Colon Cancer s/p resection in June 


7. Left sided KAVON and MCA stroke 


8. Pneumomediastinum suspicious for esophageal tear vs. carotid dissection.





DISCHARGE DIAGNOSES:


1. Left sided KAVON and MCA stroke 


2. Pneumomediastinum suspicious for esophageal tear vs. carotid dissection.


3. TIA


4. IDDM


5. Left carotid atery stenosis s/p CEA,


6. CKD stage IV,


7. CHF, Unknown type


8. Colon Cancer s/p resection in June 





COMPLICATIONS/CHIEF COMPLAINT: Altered Mental Status.





HISTORY OF PRESENT ILLNESS: 


"Patient is 84F with extensive PMH TIAs, IDDM, L. carotid artery stenosis s/p 

CEA, CKD 4, CHF of unknown type, Colon cancer s/p resection in June brought in 

by family after found unresponsive at home and found to have a L. sided KAVON and 

MCA stroke in addition to pneumomediastinum suspicious for esophageal tear vs. 

carotid dissection. 





HOSPITAL COURSE:


"Patient was unresponsive on arrival to the hospital.  She was Dx with Lactic 

acidosis, Leukocytosis, elevated troponins and Hyperglycemia with a blood sugar 

> 700.  Patient was treated with IVF and antibiotics and assessed by neurology. 

 Family declined any pressor support or surgical procedures for the patient as 

she is DNR/DNI.  Family decided to make patient comfort care after she had 

little to no improvement in her mental health status and her apparent 

discomfort.  Vital signs, blood drw\vikas and antibiotics were discontinued.  

Hospice consulted with the family and eventually made the decision to have her 

transferred to a hospice facility. Patient has been unresponsive for a couple of

 day per her daughter.  She has not taken any food and has had very little to 

drink. She appears very weak and no longer shows signs of awareness. 





DISCHARGE MEDICATIONS: Please see below.


 


ALLERGIES: Please see below.





PHYSICAL EXAMINATION ON DISCHARGE:


VITAL SIGNS: Please see below.


GENERAL:asleep, does not rouse 


HEENT:mucus membranes appear pink and moist


CARDIOVASCULAR EXAMINATION: slow, distant HS


RESPIRATORY EXAMINATION: slow, gasping breaths 


ABDOMINAL EXAMINATION: soft, nondistended 


EXTREMITIES: no edema


SKIN: warm and dry


NEUROLOGICAL EXAMINATION: unobtainable due to patient's current status 








LABORATORY DATA: Please see below.





IMAGING: 





ACTIVITY: per Hospice 





DIET: As tolerated 





DISCHARGE PLAN: Discharge to Hospice care.





DISPOSITION: Hospice Medical Facility.





DISCHARGE INSTRUCTIONS:


Management as per Hospice.





ITEMS TO FOLLOWUP ON ON OUTPATIENT:


N/A





DISCHARGE CONDITION: Into Hospice care.





TIME SPENT ON DISCHARGE: Greater than 36 minutes.





Vital Signs/I&Os





Vital Signs








  Date Time  Temp Pulse Resp B/P (MAP) Pulse Ox O2 Delivery O2 Flow Rate FiO2


 


12/15/19 11:56   18   Room Air  


 


12/12/19 09:00       3.0 


 


12/11/19 12:00 98.7 88  147/65 (92) 97   














I&O- Last 24 Hours up to 6 AM 


 


 12/16/19





 06:00


 


Intake Total 60 ml


 


Output Total 350 ml


 


Balance -290 ml











Microbiology





Microbiology


12/10/19 Blood Culture - Final, Complete


           NO GROWTH AFTER 5 DAYS


12/10/19 Blood Culture - Final, Complete


           NO GROWTH AFTER 5 DAYS





Discharge Medications


Scheduled PRN


Hyoscyamine Sulfate (Hyoscyamine Sulfate) 0.125 Mg Tab.subl, 0.125 MG PO Q4HP 

PRN for TERMINAL SECRETIONS


   Use sublingually if unable to swallow 


Lorazepam (Lorazepam) 0.5 Mg Tablet, 0.5 MG PO Q4HP PRN for ANXIETY/AGITATION


   Use sublingually if unable to swallow 


Morphine Sulfate (Morphine Sulfate) 100 Mg/5 Ml Solution, 0.25-1 ML PO Q2H PRN 

for PAIN OR DYSPNEA


   Use sublingually if unable to swallow 





Allergies


Coded Allergies:  


     Sulfa (Sulfonamide Antibiotics) (Verified  Allergy, Mild, ITCH/RASH, 

12/10/19)











SÁNCHEZ BOLTON PA-C         Dec 16, 2019 17:22